# Patient Record
Sex: MALE | Race: WHITE | NOT HISPANIC OR LATINO | Employment: FULL TIME | URBAN - METROPOLITAN AREA
[De-identification: names, ages, dates, MRNs, and addresses within clinical notes are randomized per-mention and may not be internally consistent; named-entity substitution may affect disease eponyms.]

---

## 2023-02-06 ENCOUNTER — OFFICE VISIT (OUTPATIENT)
Dept: INTERNAL MEDICINE | Facility: CLINIC | Age: 43
End: 2023-02-06
Payer: COMMERCIAL

## 2023-02-06 VITALS
OXYGEN SATURATION: 96 % | TEMPERATURE: 98 F | DIASTOLIC BLOOD PRESSURE: 90 MMHG | HEART RATE: 95 BPM | SYSTOLIC BLOOD PRESSURE: 142 MMHG | WEIGHT: 244 LBS | BODY MASS INDEX: 31.32 KG/M2 | HEIGHT: 74 IN

## 2023-02-06 DIAGNOSIS — R53.83 FATIGUE, UNSPECIFIED TYPE: ICD-10-CM

## 2023-02-06 DIAGNOSIS — H61.23 BILATERAL IMPACTED CERUMEN: ICD-10-CM

## 2023-02-06 DIAGNOSIS — Z13.1 SCREENING FOR DIABETES MELLITUS: ICD-10-CM

## 2023-02-06 DIAGNOSIS — R03.0 ELEVATED BLOOD PRESSURE READING: ICD-10-CM

## 2023-02-06 DIAGNOSIS — Z00.00 ANNUAL PHYSICAL EXAM: Primary | ICD-10-CM

## 2023-02-06 DIAGNOSIS — Z11.4 ENCOUNTER FOR SCREENING FOR HIV: ICD-10-CM

## 2023-02-06 DIAGNOSIS — Z11.59 NEED FOR HEPATITIS C SCREENING TEST: ICD-10-CM

## 2023-02-06 DIAGNOSIS — F17.200 NICOTINE DEPENDENCE WITH CURRENT USE: ICD-10-CM

## 2023-02-06 DIAGNOSIS — R10.32 LEFT LOWER QUADRANT ABDOMINAL PAIN: ICD-10-CM

## 2023-02-06 DIAGNOSIS — Z13.220 SCREENING FOR HYPERLIPIDEMIA: ICD-10-CM

## 2023-02-06 DIAGNOSIS — Z91.89 AT RISK FOR OBSTRUCTIVE SLEEP APNEA: ICD-10-CM

## 2023-02-06 DIAGNOSIS — R19.4 CHANGE IN BOWEL HABIT: ICD-10-CM

## 2023-02-06 PROCEDURE — 99214 OFFICE O/P EST MOD 30 MIN: CPT | Performed by: STUDENT IN AN ORGANIZED HEALTH CARE EDUCATION/TRAINING PROGRAM

## 2023-02-06 PROCEDURE — 99386 PREV VISIT NEW AGE 40-64: CPT | Performed by: STUDENT IN AN ORGANIZED HEALTH CARE EDUCATION/TRAINING PROGRAM

## 2023-02-06 RX ORDER — NICOTINE 21 MG/24HR
1 PATCH, TRANSDERMAL 24 HOURS TRANSDERMAL EVERY 24 HOURS
Qty: 28 EACH | Refills: 0 | Status: SHIPPED | OUTPATIENT
Start: 2023-02-06

## 2023-02-06 RX ORDER — FLUTICASONE PROPIONATE 50 MCG
1 SPRAY, SUSPENSION (ML) NASAL DAILY
COMMUNITY

## 2023-02-06 RX ORDER — NICOTINE 21 MG/24HR
1 PATCH, TRANSDERMAL 24 HOURS TRANSDERMAL EVERY 24 HOURS
Qty: 14 EACH | Refills: 0 | Status: SHIPPED | OUTPATIENT
Start: 2023-02-06

## 2023-02-06 RX ORDER — CETIRIZINE HYDROCHLORIDE 10 MG/1
10 TABLET ORAL DAILY
COMMUNITY

## 2023-02-06 NOTE — PROGRESS NOTES
Gurmeet Lopes D.O.  Internal Medicine  [facility]  [address], [address]  [place] [address]  [phone]    Chief Complaint  Annual checkup    HISTORY    Piter Valdes is a 43 y.o. male who presents to the office today as a  a new patient for their annual preventative exam.      No hospitalization(s) within the last year.     Current exercise regimen:  Not really exercising     Status of chronic medical conditions:    Allergies: takes zyrtec and flonase over the counter    Any other concerns regarding their health today:     *Has noticed dull ache in the left lower abdomen for the last 6 months. States his bowel movements are more inconsistent, like there could be something blocking the passage of stool. Sometimes the bowel movements are formed and other times more runny.  States he has noticed some blood in the stool a few times. He describes the pain as achy and there on and off throughout the day. No fever or chills. He reports that people have been telling him that he looks like he lost weight but he is not eating anything different.     *Patient is also concerned about sleep apnea. Patient states he snores heavily, wife has appreciated that he stops breathing at times. Patient does not wake up feeling well rested.     Health Maintenance Summary          Overdue - COVID-19 Vaccine (1) Overdue - never done    No completion history exists for this topic.          Overdue - TDAP/TD VACCINES (1 - Tdap) Overdue - never done    No completion history exists for this topic.          Overdue - INFLUENZA VACCINE (Yearly - August to March) Overdue since 8/1/2022 09/12/2018  Outside Immunization: Influenza Ped Quad P-Free    10/26/2016  Outside Immunization: Influenza, P-Free          Ordered - HEPATITIS C SCREENING (Once) Ordered on 2/6/2023    No completion history exists for this topic.          ANNUAL PHYSICAL (Yearly) Next due on 2/6/2024 02/06/2023  Done           "Pneumococcal Vaccine 0-64 (Series Information) Aged Out    No completion history exists for this topic.                 No Known Allergies     Outpatient Medications Marked as Taking for the 2/6/23 encounter (Office Visit) with Gurmeet Lopes,    Medication Sig Dispense Refill   • cetirizine (zyrTEC) 10 MG tablet Take 10 mg by mouth Daily.     • fluticasone (FLONASE) 50 MCG/ACT nasal spray 1 spray into the nostril(s) as directed by provider Daily.         Past Medical History:   Diagnosis Date   • Allergies    • Dog bite     both legs, sutured     Past Surgical History:   Procedure Laterality Date   • WISDOM TOOTH EXTRACTION       Family History   Problem Relation Age of Onset   • No Known Problems Mother    • Diabetes Father    • No Known Problems Sister     reports that he has been smoking cigarettes. He started smoking about 29 years ago. He has a 29.00 pack-year smoking history. He has never used smokeless tobacco. He reports current alcohol use of about 6.0 - 12.0 standard drinks per week. He reports that he does not currently use drugs after having used the following drugs: Marijuana.    Immunization History   Administered Date(s) Administered   • Influenza Injectable Mdck Pf Quad 09/14/2022        OBJECTIVE    Vital Signs:   /90   Pulse 95   Temp 98 °F (36.7 °C)   Ht 188 cm (74\")   Wt 111 kg (244 lb)   SpO2 96%   BMI 31.33 kg/m²     Physical Exam  Vitals reviewed.   Constitutional:       General: He is not in acute distress.     Appearance: Normal appearance. He is obese. He is not ill-appearing.   HENT:      Head: Normocephalic and atraumatic.      Right Ear: External ear normal. There is impacted cerumen.      Left Ear: External ear normal. There is impacted cerumen.      Mouth/Throat:      Mouth: Mucous membranes are moist.      Pharynx: No oropharyngeal exudate or posterior oropharyngeal erythema.   Eyes:      General: No scleral icterus.     Extraocular Movements: Extraocular movements intact. "      Conjunctiva/sclera: Conjunctivae normal.      Pupils: Pupils are equal, round, and reactive to light.   Cardiovascular:      Rate and Rhythm: Normal rate and regular rhythm.      Heart sounds: Normal heart sounds. No murmur heard.  Pulmonary:      Effort: Pulmonary effort is normal. No respiratory distress.      Breath sounds: Normal breath sounds. No wheezing.   Abdominal:      General: Bowel sounds are normal. There is no distension.      Palpations: Abdomen is soft.      Tenderness: There is no abdominal tenderness. There is no guarding.   Musculoskeletal:      Cervical back: Neck supple. No tenderness.      Right lower leg: No edema.      Left lower leg: No edema.   Lymphadenopathy:      Cervical: No cervical adenopathy.   Skin:     General: Skin is warm and dry.      Coloration: Skin is not jaundiced.   Neurological:      General: No focal deficit present.      Mental Status: He is alert and oriented to person, place, and time.      Cranial Nerves: No cranial nerve deficit.      Motor: No weakness.   Psychiatric:         Mood and Affect: Mood normal.         Behavior: Behavior normal.         Thought Content: Thought content normal.                             ASSESSMENT & PLAN     1. Annual Preventative Health Examination  -Age and sex appropriate physical exam performed and documented. Updated past medical, family, social and surgical histories as well as allergies and care team list. Addressed care gaps listed in the medical record.  -Encouraged annual dental and vision exams as part of their overall health.  -Encouraged minimum of 30 minutes or more of exercise at a brisk walk or higher 5 days per week combined with a well-balanced diet.   -Immunizations reviewed and updated in EMR.   -Lipid screening:  Will screen for hyperlipidemia today and calculate ASCVD risk if appropriate.    -Aspirin for primary or secondary prevention: Not applicable, patient is less than age 50.  -Depression and Anxiety  screening: PHQ2 performed and the patient's screen was negative.  -Diabetes screening: Patient is 35-70 years of age and overweight, screening for diabetes is indicated every 3 years. Screening is not up to date and will be updated today.   -Tobacco use screening: Patient reports cigarette use. Tobacco counseling was was given.  -Alcohol use screening: Patient reports drinking 6-12 drinks per week.. Alcohol abuse counseling was was not indicated.  -Illicit drug screening: Patient does not use illicit drugs.   -Abdominal aortic aneurysm screening: AAA screening is not indicated as patient is less than 65 years of age.  -Hypertension screening: + screen, see plan below  -HIV screening: will screen today  -Hepatitis C virus screening:  Will screen today  -Syphilis screening: Syphilis screening not indicated.  -Hepatitis B virus screening: Screening not indicated, not in a high-risk group.  -Colon cancer screening: being referred to GI for consideration of early colonoscopy due to change in bowel habits (see plan below)  -Lung cancer screening: Patient is less than age 50, screening not indicated.  -Prostate cancer screening: Not applicable, patient is less than 50 years old.      Follow up in 1 year for annual physical exam.    A problem-based visit was also conducted on the same day, see below for assessment and plan    Diagnoses and all orders for this visit:    1. Nicotine dependence with current use (Primary)  -29 pack years despite relatively young age  -stressed importance of nicotine cessation for heart/lung health   -discussed nicotine replacement therapy and appropriate use of patches and gum  -discussed setting a quit date and begin use of nicotine patches and gum on that date  -he seems interested in quitting so we will reassess this in 3 months at his follow up  -     nicotine (NICODERM CQ) 21 MG/24HR patch; Place 1 patch on the skin as directed by provider Daily. Use for weeks 1-4  Dispense: 28 each;  Refill: 0  -     nicotine (NICODERM CQ) 14 MG/24HR patch; Place 1 patch on the skin as directed by provider Daily. Use for weeks 5 and 6.  Dispense: 14 each; Refill: 0  -     nicotine (NICODERM CQ) 7 MG/24HR patch; Place 1 patch on the skin as directed by provider Daily. Use for weeks 7 and 8.  Dispense: 14 each; Refill: 0  -     nicotine polacrilex (NICORETTE) 2 MG gum; Chew 1 each As Needed for Smoking Cessation. Use every 3 hours for cravings. Use one piece of gum at a time. Put a piece of gum into your mouth, and chew it slowly a few times to break it down. Park the gum between your gums and cheek.  Dispense: 240 each; Refill: 1    2. Fatigue, unspecified type  3. At risk for obstructive sleep apnea  -Patient is also concerned about sleep apnea. Patient states he snores heavily, wife has appreciated that he stops breathing at times. Patient does not wake up feeling well rested.   -he has risk factors for ROXANA (male, obesity); will refer to sleep medicine for consideration of sleep study  -     Ambulatory Referral to Sleep Medicine    4. Left lower quadrant abdominal pain  5. Change in bowel habit  -Has noticed dull ache in the left lower abdomen for the last 6 months. States his bowel movements are more inconsistent, like there could be something blocking the passage of stool. Sometimes the bowel movements are formed and other times more runny.  States he has noticed some blood in the stool a few times. He describes the pain as achy and there on and off throughout the day. No fever or chills. He reports that people have been telling him that he looks like he lost weight but he is not eating anything different.   -abdominal exam is benign  -will get CT abd/pelvis with contrast to evaluate as well as referral to GI for consideration of early colonoscopy  -further plan depending upon results     6. Bilateral impacted cerumen  -begin OTC ear wax softening drops    7. Elevated blood pressure reading  -/90 in  office today  -no history of HTN  -will need to closely monitor at his next visit; if persistent in office elevation will begin ambulatory BP monitoring           The following social determinates of health impact the patient's medical decision making: No social determinates of health were factored in to today's visit.     Follow Up  Return in about 3 months (around 5/6/2023) for Recheck.      Patient/family had no further questions at this time and verbalized understanding of the plan discussed today.

## 2023-02-07 ENCOUNTER — TELEPHONE (OUTPATIENT)
Dept: INTERNAL MEDICINE | Facility: CLINIC | Age: 43
End: 2023-02-07

## 2023-02-07 LAB
ALBUMIN SERPL-MCNC: 4.6 G/DL (ref 4–5)
ALBUMIN/GLOB SERPL: 2.1 {RATIO} (ref 1.2–2.2)
ALP SERPL-CCNC: 87 IU/L (ref 44–121)
ALT SERPL-CCNC: 20 IU/L (ref 0–44)
AST SERPL-CCNC: 17 IU/L (ref 0–40)
BASOPHILS # BLD AUTO: 0 X10E3/UL (ref 0–0.2)
BASOPHILS NFR BLD AUTO: 1 %
BILIRUB SERPL-MCNC: 0.3 MG/DL (ref 0–1.2)
BUN SERPL-MCNC: 15 MG/DL (ref 6–24)
BUN/CREAT SERPL: 15 (ref 9–20)
CALCIUM SERPL-MCNC: 9.4 MG/DL (ref 8.7–10.2)
CHLORIDE SERPL-SCNC: 100 MMOL/L (ref 96–106)
CHOLEST SERPL-MCNC: 202 MG/DL (ref 100–199)
CO2 SERPL-SCNC: 22 MMOL/L (ref 20–29)
CREAT SERPL-MCNC: 0.99 MG/DL (ref 0.76–1.27)
EGFRCR SERPLBLD CKD-EPI 2021: 97 ML/MIN/1.73
EOSINOPHIL # BLD AUTO: 0.2 X10E3/UL (ref 0–0.4)
EOSINOPHIL NFR BLD AUTO: 4 %
ERYTHROCYTE [DISTWIDTH] IN BLOOD BY AUTOMATED COUNT: 12.7 % (ref 11.6–15.4)
GLOBULIN SER CALC-MCNC: 2.2 G/DL (ref 1.5–4.5)
GLUCOSE SERPL-MCNC: 174 MG/DL (ref 70–99)
HBA1C MFR BLD: 5.5 % (ref 4.8–5.6)
HCT VFR BLD AUTO: 45.9 % (ref 37.5–51)
HCV AB S/CO SERPL IA: <0.1 S/CO RATIO (ref 0–0.9)
HDLC SERPL-MCNC: 38 MG/DL
HGB BLD-MCNC: 15.8 G/DL (ref 13–17.7)
HIV 1+2 AB+HIV1 P24 AG SERPL QL IA: NON REACTIVE
IMM GRANULOCYTES # BLD AUTO: 0 X10E3/UL (ref 0–0.1)
IMM GRANULOCYTES NFR BLD AUTO: 0 %
LDLC SERPL CALC-MCNC: 77 MG/DL (ref 0–99)
LDLC/HDLC SERPL: 2 RATIO (ref 0–3.6)
LYMPHOCYTES # BLD AUTO: 2.1 X10E3/UL (ref 0.7–3.1)
LYMPHOCYTES NFR BLD AUTO: 33 %
MCH RBC QN AUTO: 30.3 PG (ref 26.6–33)
MCHC RBC AUTO-ENTMCNC: 34.4 G/DL (ref 31.5–35.7)
MCV RBC AUTO: 88 FL (ref 79–97)
MONOCYTES # BLD AUTO: 0.4 X10E3/UL (ref 0.1–0.9)
MONOCYTES NFR BLD AUTO: 7 %
NEUTROPHILS # BLD AUTO: 3.4 X10E3/UL (ref 1.4–7)
NEUTROPHILS NFR BLD AUTO: 55 %
PLATELET # BLD AUTO: 196 X10E3/UL (ref 150–450)
POTASSIUM SERPL-SCNC: 3.6 MMOL/L (ref 3.5–5.2)
PROT SERPL-MCNC: 6.8 G/DL (ref 6–8.5)
RBC # BLD AUTO: 5.22 X10E6/UL (ref 4.14–5.8)
SODIUM SERPL-SCNC: 138 MMOL/L (ref 134–144)
TRIGL SERPL-MCNC: 552 MG/DL (ref 0–149)
TSH SERPL DL<=0.005 MIU/L-ACNC: 3.19 UIU/ML (ref 0.45–4.5)
VLDLC SERPL CALC-MCNC: 87 MG/DL (ref 5–40)
WBC # BLD AUTO: 6.2 X10E3/UL (ref 3.4–10.8)

## 2023-02-07 NOTE — TELEPHONE ENCOUNTER
AFTER BEING ON HOLD FOR GREATER THAN 3 HOURS WITH [employer] INS[employer]ANCE;  STATES UNABLE TO LOCATE MEMBER IN THEIR SYSTEM, MEMBER (PATIENT) TO CONTACT EMPLOYER FOR A DATA/DEMOGRAPHIC MATCH AND OVERRIDE FOR OUT PATIENT TEST.      PRACTICE MANAGER NOTIFIED PATIENT WHO WILL CONTACT EMPLOYER

## 2023-02-13 ENCOUNTER — OFFICE VISIT (OUTPATIENT)
Dept: GASTROENTEROLOGY | Facility: CLINIC | Age: 43
End: 2023-02-13
Payer: COMMERCIAL

## 2023-02-13 ENCOUNTER — TELEPHONE (OUTPATIENT)
Dept: GASTROENTEROLOGY | Facility: CLINIC | Age: 43
End: 2023-02-13
Payer: COMMERCIAL

## 2023-02-13 VITALS
HEART RATE: 91 BPM | WEIGHT: 243.2 LBS | BODY MASS INDEX: 31.21 KG/M2 | TEMPERATURE: 96.9 F | DIASTOLIC BLOOD PRESSURE: 84 MMHG | SYSTOLIC BLOOD PRESSURE: 137 MMHG | HEIGHT: 74 IN

## 2023-02-13 DIAGNOSIS — R10.32 LEFT LOWER QUADRANT ABDOMINAL PAIN: ICD-10-CM

## 2023-02-13 DIAGNOSIS — R19.4 CHANGE IN BOWEL HABITS: Primary | ICD-10-CM

## 2023-02-13 DIAGNOSIS — K62.5 RECTAL BLEEDING: ICD-10-CM

## 2023-02-13 PROCEDURE — 99204 OFFICE O/P NEW MOD 45 MIN: CPT | Performed by: NURSE PRACTITIONER

## 2023-02-13 RX ORDER — DICYCLOMINE HYDROCHLORIDE 10 MG/1
10 CAPSULE ORAL 3 TIMES DAILY PRN
Qty: 120 CAPSULE | Refills: 3 | Status: SHIPPED | OUTPATIENT
Start: 2023-02-13

## 2023-02-13 NOTE — TELEPHONE ENCOUNTER
OK FOR HUB TO READ  SW patient via telephone for COLONOSCOPY. Scheduled 2/22/23 with arrival time of [facility] WILL CALL WITH TIME. Prep paperwork mailed to verified address on file. Patient advised arrival time may change based on [facility] guidelines. JEB APRIL [facility]

## 2023-02-13 NOTE — PROGRESS NOTES
Chief Complaint   Patient presents with   • Abdominal Pain       HPI    Piter Valdes is a  43 y.o. male here to establish care as a new patient for complaints of abdominal pain.    This patient will also follow with Dr. Robbins.    Patient reports approximately 6 months of lower abdominal pain seems to localize to the left side of his abdomen.  Pain comes and goes described as a mild ache.  No correlation eating but does seem to improve with defecation.  He also reports change in bowel habits having more frequent bowel movements smaller volume with intermittent rectal bleeding.  Subjective weight loss of around 10 pounds    Rare heartburn.  No nausea, vomiting, dysphagia, odynophagia reports appetite is good.    Recent lab work included normal TSH, LFTs along with hemogram.    No family history of colon cancer.    CT has been ordered by his primary care provider but he still trying to work out the details with his insurance company.    Past Medical History:   Diagnosis Date   • Allergies    • Clotting disorder (HCC)     See comment from GI bleeding   • Dog bite     both legs, sutured   • GI (gastrointestinal bleed)     Not hilda of dates but I have noticed blood in my stool a few times   • Hypertriglyceridemia    • Lactose intolerance 10/12/17    Not sure of exact date but has developed over time.       Past Surgical History:   Procedure Laterality Date   • WISDOM TOOTH EXTRACTION         Scheduled Meds:     Continuous Infusions: No current facility-administered medications for this visit.      PRN Meds:     No Known Allergies    Social History     Socioeconomic History   • Marital status:      Spouse name: Natalya   Tobacco Use   • Smoking status: Every Day     Packs/day: 1.00     Years: 29.00     Pack years: 29.00     Types: Cigarettes     Start date: 1/1/1994   • Smokeless tobacco: Never   • Tobacco comments:     Dont know the date but i started smoking when i was 13 years old.   Substance and Sexual  Activity   • Alcohol use: Yes     Comment: I drink alcohol but i’m not sure of an ammount   • Drug use: Not Currently     Types: Marijuana   • Sexual activity: Yes     Partners: Female     Birth control/protection: None       Family History   Problem Relation Age of Onset   • No Known Problems Mother    • Diabetes Father    • No Known Problems Sister        Review of Systems   Constitutional: Negative for activity change, appetite change, fatigue and unexpected weight change.   HENT: Negative for trouble swallowing.    Eyes: Negative.    Respiratory: Negative.    Cardiovascular: Negative.    Gastrointestinal: Positive for abdominal pain and anal bleeding. Negative for abdominal distention, blood in stool, constipation, diarrhea, nausea, rectal pain and vomiting.   Endocrine: Negative.    Genitourinary: Negative.    Musculoskeletal: Negative.    Allergic/Immunologic: Negative.    Neurological: Negative.    Hematological: Negative.    Psychiatric/Behavioral: Negative.        Vitals:    02/13/23 1403   BP: 137/84   Pulse: 91   Temp: 96.9 °F (36.1 °C)       Physical Exam  Constitutional:       Appearance: He is well-developed.   Abdominal:      General: Bowel sounds are normal. There is no distension.      Palpations: Abdomen is soft. There is no mass.      Tenderness: There is no abdominal tenderness. There is no guarding.      Hernia: No hernia is present.   Skin:     General: Skin is warm and dry.      Capillary Refill: Capillary refill takes less than 2 seconds.   Neurological:      Mental Status: He is alert and oriented to person, place, and time.   Psychiatric:         Behavior: Behavior normal.     Assessment    Diagnoses and all orders for this visit:    1. Change in bowel habits (Primary)  -     Celiac Comprehensive Panel  -     Case Request; Standing  -     Case Request    2. Left lower quadrant abdominal pain  -     Case Request; Standing  -     Case Request    3. Rectal bleeding    Other orders  -      dicyclomine (BENTYL) 10 MG capsule; Take 1 capsule by mouth 3 (Three) Times a Day As Needed (Abdominal pain).  Dispense: 120 capsule; Refill: 3       Plan    Recommend colonoscopy for change in bowel habits with abdominal pain and rectal bleeding  Patient nontender on physical examination today  Give trial of Bentyl antispasmodic in the interim  Celiac comprehensive panel today  Further recommendations and follow-up pending endoscopic findings         VANESSA Camargo  [facility]  [address] [place] [address]  Office: [phone]

## 2023-02-14 LAB
ENDOMYSIUM IGA SER QL: NEGATIVE
GLIADIN PEPTIDE IGA SER-ACNC: 5 UNITS (ref 0–19)
GLIADIN PEPTIDE IGG SER-ACNC: 3 UNITS (ref 0–19)
IGA SERPL-MCNC: 163 MG/DL (ref 90–386)
TTG IGA SER-ACNC: <2 U/ML (ref 0–3)
TTG IGG SER-ACNC: <2 U/ML (ref 0–5)

## 2023-02-22 ENCOUNTER — LAB REQUISITION (OUTPATIENT)
Dept: LAB | Facility: HOSPITAL | Age: 43
End: 2023-02-22
Payer: COMMERCIAL

## 2023-02-22 ENCOUNTER — OUTSIDE FACILITY SERVICE (OUTPATIENT)
Dept: GASTROENTEROLOGY | Facility: CLINIC | Age: 43
End: 2023-02-22
Payer: COMMERCIAL

## 2023-02-22 DIAGNOSIS — R19.4 CHANGE IN BOWEL HABIT: ICD-10-CM

## 2023-02-22 PROCEDURE — 45385 COLONOSCOPY W/LESION REMOVAL: CPT | Performed by: INTERNAL MEDICINE

## 2023-02-22 PROCEDURE — 88305 TISSUE EXAM BY PATHOLOGIST: CPT | Performed by: INTERNAL MEDICINE

## 2023-02-24 LAB
LAB AP CASE REPORT: NORMAL
PATH REPORT.FINAL DX SPEC: NORMAL
PATH REPORT.GROSS SPEC: NORMAL

## 2023-03-01 ENCOUNTER — TELEPHONE (OUTPATIENT)
Dept: GASTROENTEROLOGY | Facility: CLINIC | Age: 43
End: 2023-03-01
Payer: COMMERCIAL

## 2023-03-01 ENCOUNTER — OFFICE VISIT (OUTPATIENT)
Dept: SLEEP MEDICINE | Facility: HOSPITAL | Age: 43
End: 2023-03-01
Payer: COMMERCIAL

## 2023-03-01 VITALS
DIASTOLIC BLOOD PRESSURE: 74 MMHG | HEART RATE: 86 BPM | OXYGEN SATURATION: 97 % | SYSTOLIC BLOOD PRESSURE: 131 MMHG | BODY MASS INDEX: 31.21 KG/M2 | WEIGHT: 243.2 LBS | HEIGHT: 74 IN

## 2023-03-01 DIAGNOSIS — G47.8 NON-RESTORATIVE SLEEP: ICD-10-CM

## 2023-03-01 DIAGNOSIS — G47.10 HYPERSOMNOLENCE: ICD-10-CM

## 2023-03-01 DIAGNOSIS — E66.09 CLASS 1 OBESITY DUE TO EXCESS CALORIES WITH BODY MASS INDEX (BMI) OF 31.0 TO 31.9 IN ADULT, UNSPECIFIED WHETHER SERIOUS COMORBIDITY PRESENT: ICD-10-CM

## 2023-03-01 DIAGNOSIS — R06.81 WITNESSED EPISODE OF APNEA: Primary | ICD-10-CM

## 2023-03-01 PROCEDURE — 99204 OFFICE O/P NEW MOD 45 MIN: CPT | Performed by: NURSE PRACTITIONER

## 2023-03-01 PROCEDURE — G0463 HOSPITAL OUTPT CLINIC VISIT: HCPCS

## 2023-03-01 NOTE — TELEPHONE ENCOUNTER
----- Message from VANESSA Camargo sent at 2/17/2023 12:36 PM EST -----  Please inform the patient lab work is normal await endoscopic findings.

## 2023-03-01 NOTE — TELEPHONE ENCOUNTER
Colonoscopy placed in recall for  2/22/26.  Pt reviewed results via [employer]. Sent pt MyChart msg advising of results. Advised to call if any questions.

## 2023-03-01 NOTE — PROGRESS NOTES
[facility]  [address]  [address]  [place] [address]  Phone   Fax         Piter Valdes  [id number]   1980  43 y.o.  male      Referring Provider and PCP: Gurmeet Lopes DO    Type of service: Initial Sleep Medicine Consult.  Date of service: 3/1/2023      CHIEF COMPLAINT: Snoring, witnessed apnea, nonrestorative sleep      HISTORY OF PRESENT ILLNESS:  Thank you for asking to see Piter Valdes, 43 y.o.. Piter Valdes was seen today on 3/1/2023 at [facility].  Patient presents today with symptoms of snoring, non-restorative sleep, hypersomnolence, and witnessed apneas (per wife).  Even when he sleeps 10 hours a night he is still tired in the morning.  The symptoms are chronic for years and are persistent in nature.  Patient has no history of tonsillectomy, adenoidectomy, nasal surgery, UPPP.           SLEEP HISTORY:  Sleep schedule:  Bedtime: 11 PM to 12 AM  Wake time: 530 to 6 AM weekdays, 9 to 10 AM weekends  Normally takes about 10-15 minutes to fall asleep  Average hours of sleep: 6 hours weekdays, 10 hours weekends  Number of naps per day: 0    Symptoms:   In addition to snoring, nonrestorative sleep, and witnessed apneas patient gives the following associated symptoms:  Have you ever awakened gasping for breath, coughing, choking: Yes   Change in weight:  No   Morning headaches:  No   Awaken with a sore throat or dry mouth:  Yes   Leg jerking at night:  No   Crawly feeling/urge sensation to move in the legs: No   Teeth grinding: No   Have you ever awakened at night with a sour taste or burning sensation in your chest:  No   Do you have muscle weakness with laughing or anger:  No   Have you ever felt paralyzed while going to sleep or waking up:  No   Sleepwalking: No   Nightmares: No   Nocturia (urination at night): 2-3 times per night  Memory Problem: Yes     Medical Conditions (PMH):   1. Poor memory  2. Trouble  "concentrating    Social history:  Do you drive a commercial vehicle:  No   Shift work:  No   Tobacco use:  Yes, cigarettes  Alcohol use: 12 per week  Caffeinated drinks: 3 cups of coffee per day  Occupation:     Family History (parents and siblings) (pertaining to sleep medicine):  1. Sleep apnea-- in father    Medications: reviewed    Allergies:  Patient has no known allergies.      REVIEW OF SYSTEMS:  Pertinent positive symptoms are:  • Snoring  • Witnessed apnea  • Daytime excessive sleepiness with [place] Sleepiness Scale of Total score: 14   • Fatigue  • Trouble concentrating  • Poor memory  • Nasal congestion        PHYSICAL EXAM:  CONSTITUTINONAL:   Vitals:    03/01/23 1300   BP: 131/74   Pulse: 86   SpO2: 97%   Weight: 110 kg (243 lb 3.2 oz)   Height: 188 cm (74\")    Body mass index is 31.23 kg/m².   HEAD: atraumatic, normocephalic   NOSE: nasal passages are clear  THROAT: Mallampati class IV  NECK: Neck Circumference: 17 inches, trachea is midline  RESPIRATORY SYSTEM: Breath sounds are normal, breathing unlabored, no wheezing present on exam  CARDIOVASULAR SYSTEM: Heart sounds are regular rhythm and normal rate, no edema  NEUROLOGICAL SYSTEM: Alert and oriented x 3, normal gait  PSYCHIATRIC SYSTEM: Mood is normal/ appropriate     Office notes from care team reviewed. Office note dated 2/6/23, reviewed.    Labs reviewed.  TSH Results:  TSH    TSH 2/6/23   TSH 3.190            Most Recent A1C    HGBA1C Most Recent 2/6/23   Hemoglobin A1C 5.5      Comments are available for some flowsheets but are not being displayed.                  ASSESSMENT AND PLAN:   · Witnessed apnea (R06.81): patient's symptoms and physical examination are consistent with sleep apnea (G47.30).   I discussed the signs, symptoms, and pathophysiology of sleep apnea with this patient.  I also discussed the potential complications of untreated sleep apnea including but not limited to resistant hypertension, insulin " resistance, pulmonary hypertension, atrial fibrillation, stroke, nonrestorative sleep with hypersomnia which can increase risk for motor vehicle accidents, etc.   Different testing methods including home-based and lab based sleep studies were discussed with this patient.   Based on patient history and physical examination, the most appropriate study is home sleep study.  The order for the sleep study is placed in [facility].  The test will be scheduled after prior authorization has been obtained through patient's insurance.  Treatment and management will be discussed in more detail with this patient after the test is completed.  All questions were answered to patient's satisfaction.   · Snoring (R06.83): snoring is the sound created by turbulent airflow vibrating upper airway soft tissue.  I have also discussed factors affecting snoring including sleep deprivation, sleeping on the back and alcohol ingestion. To minimize snoring, patient is advised to have adequate sleep, sleep on their side, and avoid alcohol and sedative medications around bedtime.   · Excessive daytime sleepiness: [place] Sleepiness Scale of Total score: 14.  There are many causes of excessive daytime sleepiness including but not limited to sleep apnea, shiftwork syndrome, depression, and other medical disorders such as heart disease, kidney disease, and liver failure.  The most serious cause of excessive sleepiness is due to neurological conditions such as narcolepsy/cataplexy.  More commonly excessive sleepiness is caused by sleep apnea with frequent awakenings during sleep time.  I have discussed safety of driving and to remain vigilant while driving.  · Obesity: Body mass index is 31.23 kg/m².. Patients who are overweight or obese are at increased risk of sleep apnea/ sleep disordered breathing. Weight reduction and healthy lifestyle are encouraged in overweight/ obese patients as part of a comprehensive approach to sleep apnea treatment.     · Nicotine dependence: recommend cessation.  PCP has prescribed nicotine patches.   · Elevated blood pressure reading: at PCP office recently.  PCP monitoring.   · Poor memory and trouble concentrating      Patient will follow-up after study, 31 to 90 days after PAP initiated if applicable, or contact the office sooner for questions or concerns. Patient's questions were answered.            Thank you again for asking me to consult on this patient.  Please do not hesitate to call me if you have additional questions or concerns.       Rain Bae DNP, APRN  [facility]

## 2023-03-01 NOTE — TELEPHONE ENCOUNTER
----- Message from Jose Robbins MD sent at 2/25/2023  3:02 PM EST -----  Tubular adenomas  Hyperplastic polyps  Colonoscopy recall 3 years

## 2023-03-13 ENCOUNTER — TELEPHONE (OUTPATIENT)
Dept: INTERNAL MEDICINE | Facility: CLINIC | Age: 43
End: 2023-03-13

## 2023-03-13 NOTE — TELEPHONE ENCOUNTER
Caller: Piter Valdes    Relationship: Self    Best call back number: [phone]    What is the best time to reach you: ANYTIME     Who are you requesting to speak with (clinical staff, provider,  specific staff member): CLINICAL     What was the call regarding: PATIENT IS WONDERING IF DR. STRATTON WANTS HIM TO STILL GET THE CT SCAN. PATIENT STATES HE ALREADY HAD THE COLONOSCOPY DONE ABOUT TWO WEEKS AGO.     Do you require a callback: YES

## 2023-03-15 ENCOUNTER — HOSPITAL ENCOUNTER (OUTPATIENT)
Dept: SLEEP MEDICINE | Facility: HOSPITAL | Age: 43
Discharge: HOME | End: 2023-03-15
Admitting: NURSE PRACTITIONER
Payer: COMMERCIAL

## 2023-03-15 DIAGNOSIS — G47.10 HYPERSOMNOLENCE: ICD-10-CM

## 2023-03-15 DIAGNOSIS — G47.8 NON-RESTORATIVE SLEEP: ICD-10-CM

## 2023-03-15 DIAGNOSIS — R06.81 WITNESSED EPISODE OF APNEA: ICD-10-CM

## 2023-03-15 DIAGNOSIS — E66.09 CLASS 1 OBESITY DUE TO EXCESS CALORIES WITH BODY MASS INDEX (BMI) OF 31.0 TO 31.9 IN ADULT, UNSPECIFIED WHETHER SERIOUS COMORBIDITY PRESENT: ICD-10-CM

## 2023-03-15 PROCEDURE — 95806 SLEEP STUDY UNATT&RESP EFFT: CPT

## 2023-03-15 NOTE — TELEPHONE ENCOUNTER
Called patient and LDM that Dr. Lopes indicated in his note that he wanted patient to have both Colonoscopy and CT Abd/Pel. Informed patient to call back with any questions and concerns

## 2023-03-23 DIAGNOSIS — G47.33 OSA (OBSTRUCTIVE SLEEP APNEA): Primary | ICD-10-CM

## 2023-03-23 DIAGNOSIS — R06.83 SNORING: ICD-10-CM

## 2023-03-23 PROCEDURE — 95806 SLEEP STUDY UNATT&RESP EFFT: CPT | Performed by: INTERNAL MEDICINE

## 2023-03-24 ENCOUNTER — TELEPHONE (OUTPATIENT)
Dept: SLEEP MEDICINE | Facility: HOSPITAL | Age: 43
End: 2023-03-24
Payer: COMMERCIAL

## 2023-03-24 NOTE — TELEPHONE ENCOUNTER
Spoke with pt about results and faxed order to [facility] for set up. Pt will cb and schedule a f/u appt with Rain for compliance.

## 2023-04-14 ENCOUNTER — HOSPITAL ENCOUNTER (OUTPATIENT)
Dept: CT IMAGING | Facility: HOSPITAL | Age: 43
Discharge: HOME | End: 2023-04-14
Admitting: STUDENT IN AN ORGANIZED HEALTH CARE EDUCATION/TRAINING PROGRAM
Payer: COMMERCIAL

## 2023-04-14 DIAGNOSIS — R10.32 LEFT LOWER QUADRANT ABDOMINAL PAIN: ICD-10-CM

## 2023-04-14 DIAGNOSIS — R19.4 CHANGE IN BOWEL HABIT: ICD-10-CM

## 2023-04-14 PROCEDURE — 74177 CT ABD & PELVIS W/CONTRAST: CPT

## 2023-04-14 PROCEDURE — 25510000001 IOPAMIDOL 61 % SOLUTION: Performed by: STUDENT IN AN ORGANIZED HEALTH CARE EDUCATION/TRAINING PROGRAM

## 2023-04-14 RX ADMIN — IOPAMIDOL 85 ML: 612 INJECTION, SOLUTION INTRAVENOUS at 14:09

## 2023-05-08 ENCOUNTER — OFFICE VISIT (OUTPATIENT)
Dept: INTERNAL MEDICINE | Facility: CLINIC | Age: 43
End: 2023-05-08
Payer: COMMERCIAL

## 2023-05-08 VITALS
OXYGEN SATURATION: 96 % | DIASTOLIC BLOOD PRESSURE: 77 MMHG | WEIGHT: 245 LBS | BODY MASS INDEX: 31.44 KG/M2 | HEART RATE: 84 BPM | SYSTOLIC BLOOD PRESSURE: 124 MMHG | HEIGHT: 74 IN

## 2023-05-08 DIAGNOSIS — F17.200 NICOTINE DEPENDENCE WITH CURRENT USE: Primary | ICD-10-CM

## 2023-05-08 DIAGNOSIS — E78.1 HYPERTRIGLYCERIDEMIA: ICD-10-CM

## 2023-05-08 PROCEDURE — 99214 OFFICE O/P EST MOD 30 MIN: CPT | Performed by: STUDENT IN AN ORGANIZED HEALTH CARE EDUCATION/TRAINING PROGRAM

## 2023-05-08 RX ORDER — VARENICLINE TARTRATE 1 MG/1
1 TABLET, FILM COATED ORAL 2 TIMES DAILY
Qty: 60 TABLET | Refills: 1 | Status: SHIPPED | OUTPATIENT
Start: 2023-06-07

## 2023-05-08 RX ORDER — VARENICLINE TARTRATE 0.5 MG/1
TABLET, FILM COATED ORAL
Qty: 11 TABLET | Refills: 0 | Status: SHIPPED | OUTPATIENT
Start: 2023-05-08 | End: 2023-05-15

## 2023-05-08 NOTE — PROGRESS NOTES
"  Gurmeet Lopes D.O.  Internal Medicine  [facility]  [address], [address]  [place] [address]  [phone]      Chief Complaint  3 months check-up    SUBJECTIVE    History of Present Illness    Piter Valdes is a 43 y.o. male who presents to the office today as an established patient that last saw me on 2/6/2023.     Smoking: still smoking a little less than one pack per day. Hasn't used nicotine patched yet that was prescribed last visit. States he wants to quit but just doesn't know if he can set that quit date to actually stop.     ROXANA: new diagnosis since last visit , has been using CPAP machine for around 3 weeks but still feels tired most of the time. Using it at least 6-8 hours nightly. Feels that his seal is good. Follows with sleep medicine.     No Known Allergies     Outpatient Medications Marked as Taking for the 5/8/23 encounter (Office Visit) with Gurmeet Lopes, DO   Medication Sig Dispense Refill   • cetirizine (zyrTEC) 10 MG tablet Take 1 tablet by mouth Daily.     • fluticasone (FLONASE) 50 MCG/ACT nasal spray 1 spray into the nostril(s) as directed by provider Daily.          Past Medical History:   Diagnosis Date   • Allergies    • Clotting disorder     See comment from GI bleeding   • Dog bite     both legs, sutured   • GI (gastrointestinal bleed)     Not hilda of dates but I have noticed blood in my stool a few times   • Hypertriglyceridemia    • Lactose intolerance 10/12/17    Not sure of exact date but has developed over time.   • ROXANA (obstructive sleep apnea)        OBJECTIVE    Vital Signs:   /77   Pulse 84   Ht 188 cm (74\")   Wt 111 kg (245 lb)   SpO2 96%   BMI 31.46 kg/m²     Physical Exam  Vitals reviewed.   Constitutional:       General: He is not in acute distress.     Appearance: He is obese. He is not ill-appearing.   Eyes:      General: No scleral icterus.  Pulmonary:      Effort: Pulmonary effort is normal. No respiratory distress. "   Skin:     Coloration: Skin is not jaundiced.   Neurological:      Mental Status: He is alert.   Psychiatric:         Mood and Affect: Mood normal.         Behavior: Behavior normal.         Thought Content: Thought content normal.                             ASSESSMENT & PLAN     Diagnoses and all orders for this visit:    1. Nicotine dependence with current use (Primary)  -chronic, uncontrolled   -still smoking a little less than one pack per day. Hasn't used nicotine patched yet that was prescribed last visit. States he wants to quit but just doesn't know if he can set that quit date to actually stop.   -discussed the importance to lung/heart health to stop smoking  -offered more aggressive cessation options such as Chantix. He is interested in treatment.   -discussed approaches to selecting a tobacco quit date with Chantix: May either choose a fixed quit date (ie, start varenicline, then quit on day 8) or a flexible quit date (ie, start varenicline, then quit between days 8 to 35). Alternatively, a gradual quit date (ie, start varenicline and reduce smoking 50% by week 4, reduce an additional 50% by week 8, and continue reducing with a goal of complete abstinence by week 12) is acceptable. Discussed common side effects including nausea, vomiting, abnormal dreams, headache, changes in sleep, changes in mood. He was agreeable to start therapy.   -     varenicline (Chantix) 0.5 MG tablet; Take 1 tablet by mouth Daily for 3 days, THEN 1 tablet 2 (Two) Times a Day for 4 days. Use for days 1-7.  Dispense: 11 tablet; Refill: 0  -     varenicline (CHANTIX) 1 MG tablet; Take 1 tablet by mouth 2 (Two) Times a Day. To start on DAY 8 of treatment.  Dispense: 60 tablet; Refill: 1    2. Hypertriglyceridemia  Lab Results   Component Value Date    CHLPL 202 (H) 02/06/2023    TRIG 552 (H) 02/06/2023    HDL 38 (L) 02/06/2023    LDL 77 02/06/2023     -recommended decreased fast/fatty/greast foods, increased  exercise  -Triglycerides very high at 552 but pt was not fasting  -repeat fasting lipid profile in the next few weeks to further assess   -     Lipid Panel With LDL/HDL Ratio                  The following social determinates of health impact the patient's medical decision making: No social determinates of health were factored in to today's visit.     Follow Up  Return in about 3 months (around 8/8/2023) for Recheck.    Patient/family had no further questions at this time and verbalized understanding of the plan discussed today.

## 2023-05-22 LAB
CHOLEST SERPL-MCNC: 194 MG/DL (ref 0–200)
HDLC SERPL-MCNC: 41 MG/DL (ref 40–60)
LDLC SERPL CALC-MCNC: 129 MG/DL (ref 0–100)
LDLC/HDLC SERPL: 3.07 {RATIO}
TRIGL SERPL-MCNC: 136 MG/DL (ref 0–150)
VLDLC SERPL CALC-MCNC: 24 MG/DL (ref 5–40)

## 2023-11-10 ENCOUNTER — TELEPHONE (OUTPATIENT)
Dept: INTERNAL MEDICINE | Facility: CLINIC | Age: 43
End: 2023-11-10
Payer: COMMERCIAL

## 2024-05-05 ENCOUNTER — HOSPITAL ENCOUNTER (EMERGENCY)
Facility: HOSPITAL | Age: 44
Discharge: HOME | End: 2024-05-06
Attending: EMERGENCY MEDICINE | Admitting: EMERGENCY MEDICINE
Payer: COMMERCIAL

## 2024-05-05 ENCOUNTER — APPOINTMENT (OUTPATIENT)
Dept: GENERAL RADIOLOGY | Facility: HOSPITAL | Age: 44
End: 2024-05-05
Payer: COMMERCIAL

## 2024-05-05 DIAGNOSIS — J98.01 BRONCHOSPASM: ICD-10-CM

## 2024-05-05 DIAGNOSIS — R00.2 PALPITATIONS: Primary | ICD-10-CM

## 2024-05-05 DIAGNOSIS — R03.0 ELEVATED BLOOD PRESSURE READING: ICD-10-CM

## 2024-05-05 LAB
ALBUMIN SERPL-MCNC: 4.8 G/DL (ref 3.5–5.2)
ALBUMIN/GLOB SERPL: 1.8 G/DL
ALP SERPL-CCNC: 76 U/L (ref 39–117)
ALT SERPL W P-5'-P-CCNC: 29 U/L (ref 1–41)
ANION GAP SERPL CALCULATED.3IONS-SCNC: 10.4 MMOL/L (ref 5–15)
AST SERPL-CCNC: 23 U/L (ref 1–40)
BASOPHILS # BLD AUTO: 0.07 10*3/MM3 (ref 0–0.2)
BASOPHILS NFR BLD AUTO: 0.7 % (ref 0–1.5)
BILIRUB SERPL-MCNC: 0.3 MG/DL (ref 0–1.2)
BUN SERPL-MCNC: 19 MG/DL (ref 6–20)
BUN/CREAT SERPL: 17.8 (ref 7–25)
CALCIUM SPEC-SCNC: 10.3 MG/DL (ref 8.6–10.5)
CHLORIDE SERPL-SCNC: 99 MMOL/L (ref 98–107)
CO2 SERPL-SCNC: 26.6 MMOL/L (ref 22–29)
CREAT SERPL-MCNC: 1.07 MG/DL (ref 0.76–1.27)
DEPRECATED RDW RBC AUTO: 39.8 FL (ref 37–54)
EGFRCR SERPLBLD CKD-EPI 2021: 87.8 ML/MIN/1.73
EOSINOPHIL # BLD AUTO: 0.31 10*3/MM3 (ref 0–0.4)
EOSINOPHIL NFR BLD AUTO: 3 % (ref 0.3–6.2)
ERYTHROCYTE [DISTWIDTH] IN BLOOD BY AUTOMATED COUNT: 12.3 % (ref 12.3–15.4)
GLOBULIN UR ELPH-MCNC: 2.7 GM/DL
GLUCOSE SERPL-MCNC: 133 MG/DL (ref 65–99)
HCT VFR BLD AUTO: 47.8 % (ref 37.5–51)
HGB BLD-MCNC: 16.4 G/DL (ref 13–17.7)
IMM GRANULOCYTES # BLD AUTO: 0.02 10*3/MM3 (ref 0–0.05)
IMM GRANULOCYTES NFR BLD AUTO: 0.2 % (ref 0–0.5)
LYMPHOCYTES # BLD AUTO: 2.65 10*3/MM3 (ref 0.7–3.1)
LYMPHOCYTES NFR BLD AUTO: 25.5 % (ref 19.6–45.3)
MCH RBC QN AUTO: 29.9 PG (ref 26.6–33)
MCHC RBC AUTO-ENTMCNC: 34.3 G/DL (ref 31.5–35.7)
MCV RBC AUTO: 87.1 FL (ref 79–97)
MONOCYTES # BLD AUTO: 0.58 10*3/MM3 (ref 0.1–0.9)
MONOCYTES NFR BLD AUTO: 5.6 % (ref 5–12)
NEUTROPHILS NFR BLD AUTO: 6.78 10*3/MM3 (ref 1.7–7)
NEUTROPHILS NFR BLD AUTO: 65 % (ref 42.7–76)
PLATELET # BLD AUTO: 191 10*3/MM3 (ref 140–450)
PMV BLD AUTO: 9.9 FL (ref 6–12)
POTASSIUM SERPL-SCNC: 3.6 MMOL/L (ref 3.5–5.2)
PROT SERPL-MCNC: 7.5 G/DL (ref 6–8.5)
RBC # BLD AUTO: 5.49 10*6/MM3 (ref 4.14–5.8)
SODIUM SERPL-SCNC: 136 MMOL/L (ref 136–145)
TROPONIN T SERPL HS-MCNC: <6 NG/L
WBC NRBC COR # BLD AUTO: 10.41 10*3/MM3 (ref 3.4–10.8)

## 2024-05-05 PROCEDURE — 80053 COMPREHEN METABOLIC PANEL: CPT | Performed by: EMERGENCY MEDICINE

## 2024-05-05 PROCEDURE — 99284 EMERGENCY DEPT VISIT MOD MDM: CPT

## 2024-05-05 PROCEDURE — 93005 ELECTROCARDIOGRAM TRACING: CPT | Performed by: EMERGENCY MEDICINE

## 2024-05-05 PROCEDURE — 71045 X-RAY EXAM CHEST 1 VIEW: CPT

## 2024-05-05 PROCEDURE — 84484 ASSAY OF TROPONIN QUANT: CPT | Performed by: EMERGENCY MEDICINE

## 2024-05-05 PROCEDURE — 25010000002 LABETALOL 5 MG/ML SOLUTION: Performed by: EMERGENCY MEDICINE

## 2024-05-05 PROCEDURE — 96374 THER/PROPH/DIAG INJ IV PUSH: CPT

## 2024-05-05 PROCEDURE — 99284 EMERGENCY DEPT VISIT MOD MDM: CPT | Performed by: EMERGENCY MEDICINE

## 2024-05-05 PROCEDURE — 93010 ELECTROCARDIOGRAM REPORT: CPT | Performed by: INTERNAL MEDICINE

## 2024-05-05 PROCEDURE — 85025 COMPLETE CBC W/AUTO DIFF WBC: CPT | Performed by: EMERGENCY MEDICINE

## 2024-05-05 RX ORDER — LABETALOL HYDROCHLORIDE 5 MG/ML
20 INJECTION, SOLUTION INTRAVENOUS ONCE
Status: COMPLETED | OUTPATIENT
Start: 2024-05-05 | End: 2024-05-05

## 2024-05-05 RX ORDER — SODIUM CHLORIDE 0.9 % (FLUSH) 0.9 %
10 SYRINGE (ML) INJECTION AS NEEDED
Status: DISCONTINUED | OUTPATIENT
Start: 2024-05-05 | End: 2024-05-06 | Stop reason: HOSPADM

## 2024-05-05 RX ADMIN — LABETALOL HYDROCHLORIDE 20 MG: 5 INJECTION, SOLUTION INTRAVENOUS at 23:14

## 2024-05-06 VITALS
BODY MASS INDEX: 32.75 KG/M2 | RESPIRATION RATE: 18 BRPM | DIASTOLIC BLOOD PRESSURE: 79 MMHG | HEIGHT: 74 IN | HEART RATE: 87 BPM | SYSTOLIC BLOOD PRESSURE: 133 MMHG | OXYGEN SATURATION: 95 % | WEIGHT: 255.2 LBS | TEMPERATURE: 98.4 F

## 2024-05-06 LAB
GEN 5 2HR TROPONIN T REFLEX: <6 NG/L
QT INTERVAL: 332 MS
QTC INTERVAL: 418 MS
TROPONIN T DELTA: NORMAL

## 2024-05-06 PROCEDURE — 63710000001 PREDNISONE PER 5 MG: Performed by: EMERGENCY MEDICINE

## 2024-05-06 PROCEDURE — 84484 ASSAY OF TROPONIN QUANT: CPT | Performed by: EMERGENCY MEDICINE

## 2024-05-06 PROCEDURE — 36415 COLL VENOUS BLD VENIPUNCTURE: CPT

## 2024-05-06 PROCEDURE — 94640 AIRWAY INHALATION TREATMENT: CPT

## 2024-05-06 RX ORDER — PREDNISONE 20 MG/1
TABLET ORAL
Qty: 12 TABLET | Refills: 0 | Status: SHIPPED | OUTPATIENT
Start: 2024-05-06

## 2024-05-06 RX ORDER — PREDNISONE 50 MG/1
50 TABLET ORAL ONCE
Status: COMPLETED | OUTPATIENT
Start: 2024-05-06 | End: 2024-05-06

## 2024-05-06 RX ORDER — ALBUTEROL SULFATE 90 UG/1
AEROSOL, METERED RESPIRATORY (INHALATION)
Qty: 6.7 G | Refills: 0 | Status: SHIPPED | OUTPATIENT
Start: 2024-05-06

## 2024-05-06 RX ORDER — AMLODIPINE BESYLATE 5 MG/1
5 TABLET ORAL DAILY
Qty: 30 TABLET | Refills: 0 | Status: SHIPPED | OUTPATIENT
Start: 2024-05-06 | End: 2024-06-05

## 2024-05-06 RX ORDER — IPRATROPIUM BROMIDE AND ALBUTEROL SULFATE 2.5; .5 MG/3ML; MG/3ML
3 SOLUTION RESPIRATORY (INHALATION) ONCE
Status: COMPLETED | OUTPATIENT
Start: 2024-05-06 | End: 2024-05-06

## 2024-05-06 RX ADMIN — PREDNISONE 50 MG: 50 TABLET ORAL at 01:43

## 2024-05-06 RX ADMIN — IPRATROPIUM BROMIDE AND ALBUTEROL SULFATE 3 ML: 2.5; .5 SOLUTION RESPIRATORY (INHALATION) at 01:10

## 2024-06-10 ENCOUNTER — TELEPHONE (OUTPATIENT)
Dept: INTERNAL MEDICINE | Facility: CLINIC | Age: 44
End: 2024-06-10
Payer: COMMERCIAL

## 2024-06-10 NOTE — TELEPHONE ENCOUNTER
Pt calling to schedule ER follow up from 05/05/2024- Pt was seen for high blood pressure- Pt states the ER doctor prescribed him bp medication, amlodipine 5 mg, and pt is almost out of it- Pt is wanting to know if he can get prescription refilled by Dr. Lopes? Please advise-       Pt is coming in for ER follow up appt this Wednesday, 06/12/2024.

## 2024-06-12 ENCOUNTER — OFFICE VISIT (OUTPATIENT)
Dept: INTERNAL MEDICINE | Facility: CLINIC | Age: 44
End: 2024-06-12
Payer: COMMERCIAL

## 2024-06-12 VITALS
BODY MASS INDEX: 31.44 KG/M2 | HEIGHT: 74 IN | DIASTOLIC BLOOD PRESSURE: 80 MMHG | SYSTOLIC BLOOD PRESSURE: 136 MMHG | HEART RATE: 64 BPM | WEIGHT: 245 LBS | OXYGEN SATURATION: 97 %

## 2024-06-12 DIAGNOSIS — I10 ESSENTIAL HYPERTENSION: Primary | ICD-10-CM

## 2024-06-12 DIAGNOSIS — I10 ESSENTIAL HYPERTENSION: ICD-10-CM

## 2024-06-12 DIAGNOSIS — F17.200 NICOTINE DEPENDENCE WITH CURRENT USE: ICD-10-CM

## 2024-06-12 DIAGNOSIS — E66.09 CLASS 1 OBESITY DUE TO EXCESS CALORIES WITH SERIOUS COMORBIDITY AND BODY MASS INDEX (BMI) OF 31.0 TO 31.9 IN ADULT: ICD-10-CM

## 2024-06-12 PROCEDURE — 99214 OFFICE O/P EST MOD 30 MIN: CPT | Performed by: STUDENT IN AN ORGANIZED HEALTH CARE EDUCATION/TRAINING PROGRAM

## 2024-06-12 RX ORDER — AMLODIPINE BESYLATE 5 MG/1
5 TABLET ORAL DAILY
Qty: 30 TABLET | Refills: 1 | Status: SHIPPED | OUTPATIENT
Start: 2024-06-12

## 2024-06-12 RX ORDER — AZELASTINE 1 MG/ML
2 SPRAY, METERED NASAL 2 TIMES DAILY
COMMUNITY

## 2024-06-12 RX ORDER — AMLODIPINE BESYLATE 5 MG/1
5 TABLET ORAL DAILY
Qty: 90 TABLET | OUTPATIENT
Start: 2024-06-12

## 2024-06-12 NOTE — PROGRESS NOTES
"  Gurmeet Lopes D.O.  Internal Medicine  [facility]  [address], [address]  [place] [address]  [phone]      Chief Complaint  Hypertension    SUBJECTIVE    History of Present Illness    Piter Valdes is a 44 y.o. male who presents to the office today as an established patient that last saw me on 5/8/2023.     Here today to discuss high blood pressure.  States that he was recently in the emergency department and prescribed amlodipine 5 mg daily for an elevated blood pressure reading, heart racing. He reports his wife checked BP before going to ER and it was \"180/something\".    States he had been drinking lots of alcohol around the time his BP was high. States he took the entire 30 day supply of amlodipine, ended around 3 days ago. He felt normal self on the medication but was not checking BP over this time.  Exercise is primarily home activity.     No Known Allergies     Outpatient Medications Marked as Taking for the 6/12/24 encounter (Office Visit) with Gurmeet Lopes, DO   Medication Sig Dispense Refill    albuterol sulfate  (90 Base) MCG/ACT inhaler 2 puffs every 4-6h as needed for soa 6.7 g 0    azelastine (ASTELIN) 0.1 % nasal spray 2 sprays into the nostril(s) as directed by provider 2 (Two) Times a Day. Use in each nostril as directed      cetirizine (zyrTEC) 10 MG tablet Take 1 tablet by mouth Daily.      fluticasone (FLONASE) 50 MCG/ACT nasal spray 1 spray into the nostril(s) as directed by provider Daily.      predniSONE (DELTASONE) 20 MG tablet 3 po daily x 2d, then 2 po daily x 2d, then 1 po daily x 2d. 12 tablet 0        Past Medical History:   Diagnosis Date    Allergies     Clotting disorder     See comment from GI bleeding    Dog bite     both legs, sutured    GI (gastrointestinal bleed)     Not hilda of dates but I have noticed blood in my stool a few times    Hypertriglyceridemia     Lactose intolerance 10/12/17    Not sure of exact date but has developed " "over time.    ROXANA (obstructive sleep apnea)        OBJECTIVE    Vital Signs:   /80   Pulse 64   Ht 188 cm (74\")   Wt 111 kg (245 lb)   SpO2 97%   BMI 31.46 kg/m²        Physical Exam  Vitals reviewed.   Constitutional:       General: He is not in acute distress.     Appearance: Normal appearance. He is obese. He is not ill-appearing.   Eyes:      General: No scleral icterus.  Cardiovascular:      Rate and Rhythm: Normal rate and regular rhythm.      Heart sounds: Normal heart sounds. No murmur heard.  Pulmonary:      Effort: Pulmonary effort is normal. No respiratory distress.      Breath sounds: Normal breath sounds. No wheezing.   Musculoskeletal:      Right lower leg: No edema.      Left lower leg: No edema.   Skin:     Coloration: Skin is not jaundiced.   Neurological:      Mental Status: He is alert.   Psychiatric:         Mood and Affect: Mood normal.         Behavior: Behavior normal.         Thought Content: Thought content normal.                             ASSESSMENT & PLAN     Diagnoses and all orders for this visit:    1. Essential hypertension (Primary)  2. Nicotine dependence with current use  3. Class 1 obesity due to excess calories with serious comorbidity and body mass index (BMI) of 31.0 to 31.9 in adult  -Seen 5/6/[facility] at [facility] with diagnosis of palpitations, elevated blood pressure reading, bronchospasm.  He had CBC, CMP, troponin which I reviewed and were all unremarkable.  He had chest x-ray which showed no acute cardiopulmonary process.  He was started on prednisone taper for bronchospasm, referred to cardiology and started on amlodipine 5 mg daily.  -/80 in office today, above goal of systolic 130 or less, diastolic 80 or  less.  -at this point it seems he has a diagnosis of HTN. Likely due to a combination of factors: obesity, lack of exercise, smoking.  -we again discussed smoking cessation. He has been prescribed nicotine and chantix in the past but never picked " them up. States he is not quite ready to quit at this time. Recommended he increase physical activity to 30 minutes daily or more, focus on decreased portion sizes and weight loss, reducing alcohol to less than 1 drink daily on average, avoiding high salt foods and choosing low salt options when available.  -we discussed the increasing risk as he ages of heart attack and stroke in the setting of high blood pressure, high cholesterol and cigarette use.   -I will restart his amlodipine 5 mg daily and have him back in 4-5 weeks for a follow up and I have asked him to check BP during that time and bring a log to next visit.   -     amLODIPine (NORVASC) 5 MG tablet; Take 1 tablet by mouth Daily.  Dispense: 30 tablet; Refill: 1      BMI is >= 30 and <35. (Class 1 Obesity). The following options were offered after discussion;: exercise counseling/recommendations and nutrition counseling/recommendations.        Follow Up  Return in about 4 weeks (around 7/10/2024) for Annual physical.    Patient/family had no further questions at this time and verbalized understanding of the plan discussed today.

## 2024-08-27 ENCOUNTER — TELEPHONE (OUTPATIENT)
Dept: INTERNAL MEDICINE | Facility: CLINIC | Age: 44
End: 2024-08-27
Payer: COMMERCIAL

## 2024-08-27 NOTE — TELEPHONE ENCOUNTER
Patient called and states he took his last pill today and needs a refill on:      amLODIPine (NORVASC) 5 MG tablet     Pharmacy:      [id number] [place], [place] - [address] [facility] [address] [facility] - [phone] [facility] [phone]      Patient also wanted me to let Dr. Lopes / Dennies know that his BP is still running a little high even since he has started taking this medication. I told him to take readings and write them down that way when Dr. Lopes or Dennies calls him back they will have an idea of what the next steps are if any. Patient is scheduled for a Physical on 10/02/2024 at 3:15 pm. Please call the patient back at [phone].

## 2024-09-01 DIAGNOSIS — I10 ESSENTIAL HYPERTENSION: ICD-10-CM

## 2024-09-01 RX ORDER — AMLODIPINE BESYLATE 10 MG/1
10 TABLET ORAL DAILY
Qty: 90 TABLET | Refills: 0 | Status: SHIPPED | OUTPATIENT
Start: 2024-09-01

## 2024-09-02 ENCOUNTER — HOSPITAL ENCOUNTER (EMERGENCY)
Facility: HOSPITAL | Age: 44
Discharge: HOME | End: 2024-09-02
Attending: EMERGENCY MEDICINE
Payer: COMMERCIAL

## 2024-09-02 ENCOUNTER — APPOINTMENT (OUTPATIENT)
Dept: GENERAL RADIOLOGY | Facility: HOSPITAL | Age: 44
End: 2024-09-02
Payer: COMMERCIAL

## 2024-09-02 VITALS
HEART RATE: 88 BPM | HEIGHT: 74 IN | RESPIRATION RATE: 17 BRPM | WEIGHT: 250 LBS | SYSTOLIC BLOOD PRESSURE: 152 MMHG | DIASTOLIC BLOOD PRESSURE: 83 MMHG | TEMPERATURE: 98.2 F | OXYGEN SATURATION: 93 % | BODY MASS INDEX: 32.08 KG/M2

## 2024-09-02 DIAGNOSIS — R07.89 ATYPICAL CHEST PAIN: Primary | ICD-10-CM

## 2024-09-02 DIAGNOSIS — R00.2 PALPITATIONS: ICD-10-CM

## 2024-09-02 LAB
ALBUMIN SERPL-MCNC: 4.6 G/DL (ref 3.5–5.2)
ALBUMIN/GLOB SERPL: 1.6 G/DL
ALP SERPL-CCNC: 74 U/L (ref 39–117)
ALT SERPL W P-5'-P-CCNC: 23 U/L (ref 1–41)
ANION GAP SERPL CALCULATED.3IONS-SCNC: 10.8 MMOL/L (ref 5–15)
AST SERPL-CCNC: 27 U/L (ref 1–40)
BASOPHILS # BLD AUTO: 0.06 10*3/MM3 (ref 0–0.2)
BASOPHILS NFR BLD AUTO: 0.6 % (ref 0–1.5)
BILIRUB SERPL-MCNC: <0.2 MG/DL (ref 0–1.2)
BUN SERPL-MCNC: 9 MG/DL (ref 6–20)
BUN/CREAT SERPL: 11.1 (ref 7–25)
CALCIUM SPEC-SCNC: 9 MG/DL (ref 8.6–10.5)
CHLORIDE SERPL-SCNC: 100 MMOL/L (ref 98–107)
CO2 SERPL-SCNC: 25.2 MMOL/L (ref 22–29)
CREAT SERPL-MCNC: 0.81 MG/DL (ref 0.76–1.27)
D DIMER PPP FEU-MCNC: 0.27 MCGFEU/ML (ref 0–0.5)
DEPRECATED RDW RBC AUTO: 39.1 FL (ref 37–54)
EGFRCR SERPLBLD CKD-EPI 2021: 111.5 ML/MIN/1.73
EOSINOPHIL # BLD AUTO: 0.16 10*3/MM3 (ref 0–0.4)
EOSINOPHIL NFR BLD AUTO: 1.6 % (ref 0.3–6.2)
ERYTHROCYTE [DISTWIDTH] IN BLOOD BY AUTOMATED COUNT: 12 % (ref 12.3–15.4)
ETHANOL BLD-MCNC: 86 MG/DL (ref 0–10)
ETHANOL UR QL: 0.09 %
GEN 5 2HR TROPONIN T REFLEX: <6 NG/L
GLOBULIN UR ELPH-MCNC: 2.9 GM/DL
GLUCOSE SERPL-MCNC: 125 MG/DL (ref 65–99)
HCT VFR BLD AUTO: 45.8 % (ref 37.5–51)
HGB BLD-MCNC: 15.7 G/DL (ref 13–17.7)
IMM GRANULOCYTES # BLD AUTO: 0.02 10*3/MM3 (ref 0–0.05)
IMM GRANULOCYTES NFR BLD AUTO: 0.2 % (ref 0–0.5)
LYMPHOCYTES # BLD AUTO: 2.58 10*3/MM3 (ref 0.7–3.1)
LYMPHOCYTES NFR BLD AUTO: 26.4 % (ref 19.6–45.3)
MCH RBC QN AUTO: 29.8 PG (ref 26.6–33)
MCHC RBC AUTO-ENTMCNC: 34.3 G/DL (ref 31.5–35.7)
MCV RBC AUTO: 87.1 FL (ref 79–97)
MONOCYTES # BLD AUTO: 0.71 10*3/MM3 (ref 0.1–0.9)
MONOCYTES NFR BLD AUTO: 7.3 % (ref 5–12)
NEUTROPHILS NFR BLD AUTO: 6.23 10*3/MM3 (ref 1.7–7)
NEUTROPHILS NFR BLD AUTO: 63.9 % (ref 42.7–76)
PLATELET # BLD AUTO: 260 10*3/MM3 (ref 140–450)
PMV BLD AUTO: 9.5 FL (ref 6–12)
POTASSIUM SERPL-SCNC: 4.2 MMOL/L (ref 3.5–5.2)
PROT SERPL-MCNC: 7.5 G/DL (ref 6–8.5)
QT INTERVAL: 365 MS
QTC INTERVAL: 442 MS
RBC # BLD AUTO: 5.26 10*6/MM3 (ref 4.14–5.8)
SODIUM SERPL-SCNC: 136 MMOL/L (ref 136–145)
TROPONIN T DELTA: NORMAL
TROPONIN T SERPL HS-MCNC: <6 NG/L
WBC NRBC COR # BLD AUTO: 9.76 10*3/MM3 (ref 3.4–10.8)

## 2024-09-02 PROCEDURE — 85379 FIBRIN DEGRADATION QUANT: CPT | Performed by: EMERGENCY MEDICINE

## 2024-09-02 PROCEDURE — 25810000003 SODIUM CHLORIDE 0.9 % SOLUTION: Performed by: EMERGENCY MEDICINE

## 2024-09-02 PROCEDURE — 99284 EMERGENCY DEPT VISIT MOD MDM: CPT

## 2024-09-02 PROCEDURE — 80053 COMPREHEN METABOLIC PANEL: CPT | Performed by: EMERGENCY MEDICINE

## 2024-09-02 PROCEDURE — 96360 HYDRATION IV INFUSION INIT: CPT

## 2024-09-02 PROCEDURE — 93005 ELECTROCARDIOGRAM TRACING: CPT | Performed by: EMERGENCY MEDICINE

## 2024-09-02 PROCEDURE — 93010 ELECTROCARDIOGRAM REPORT: CPT | Performed by: INTERNAL MEDICINE

## 2024-09-02 PROCEDURE — 36415 COLL VENOUS BLD VENIPUNCTURE: CPT

## 2024-09-02 PROCEDURE — 99284 EMERGENCY DEPT VISIT MOD MDM: CPT | Performed by: EMERGENCY MEDICINE

## 2024-09-02 PROCEDURE — 82077 ASSAY SPEC XCP UR&BREATH IA: CPT | Performed by: EMERGENCY MEDICINE

## 2024-09-02 PROCEDURE — 84484 ASSAY OF TROPONIN QUANT: CPT | Performed by: EMERGENCY MEDICINE

## 2024-09-02 PROCEDURE — 85025 COMPLETE CBC W/AUTO DIFF WBC: CPT | Performed by: EMERGENCY MEDICINE

## 2024-09-02 PROCEDURE — 71045 X-RAY EXAM CHEST 1 VIEW: CPT

## 2024-09-02 RX ORDER — ASPIRIN 81 MG/1
81 TABLET ORAL DAILY
Qty: 60 TABLET | Refills: 0 | Status: SHIPPED | OUTPATIENT
Start: 2024-09-02 | End: 2024-11-01

## 2024-09-02 RX ORDER — SODIUM CHLORIDE 0.9 % (FLUSH) 0.9 %
10 SYRINGE (ML) INJECTION AS NEEDED
Status: DISCONTINUED | OUTPATIENT
Start: 2024-09-02 | End: 2024-09-02 | Stop reason: HOSPADM

## 2024-09-02 RX ORDER — ASPIRIN 325 MG
325 TABLET ORAL ONCE
Status: COMPLETED | OUTPATIENT
Start: 2024-09-02 | End: 2024-09-02

## 2024-09-02 RX ADMIN — ASPIRIN 325 MG: 325 TABLET ORAL at 18:20

## 2024-09-02 RX ADMIN — SODIUM CHLORIDE 1000 ML: 9 INJECTION, SOLUTION INTRAVENOUS at 18:21

## 2024-09-02 NOTE — Clinical Note
[facility]  [address]  [facility] [id number]    Piter Valdes was seen and treated in our emergency department on 9/2/2024.  He may return to work on 09/04/2024.         Thank you for choosing [facility].    [facility], Todd, DO

## 2024-09-02 NOTE — TELEPHONE ENCOUNTER
Lets increase to amlodipine 10 mg daily. New rx has been sent to his pharmacy. One 10 mg tablet per day.

## 2024-09-02 NOTE — FSED PROVIDER NOTE
Subjective   History of Present Illness  44-year-old male said earlier this afternoon he felt like he was having palpitations and has had something like this before and had a workup which was negative.  He feels like it is beating fast and his blood pressure is higher than normal usually is systolic 120-130 and he is on amlodipine 5 mg once a day.  It is a holiday weekend and he has been with family and to wedding and active.          Review of Systems    Past Medical History:   Diagnosis Date   • Allergies    • Clotting disorder     See comment from GI bleeding   • Dog bite     both legs, sutured   • Essential hypertension    • GI (gastrointestinal bleed)     Not hilda of dates but I have noticed blood in my stool a few times   • Hypertriglyceridemia    • Lactose intolerance 10/12/17    Not sure of exact date but has developed over time.   • ROXANA (obstructive sleep apnea)        No Known Allergies    Past Surgical History:   Procedure Laterality Date   • WISDOM TOOTH EXTRACTION         Family History   Problem Relation Age of Onset   • No Known Problems Mother    • Diabetes Father    • No Known Problems Sister        Social History     Socioeconomic History   • Marital status:      Spouse name: Natalya   Tobacco Use   • Smoking status: Every Day     Current packs/day: 1.00     Average packs/day: 1 pack/day for 30.7 years (30.7 ttl pk-yrs)     Types: Cigarettes     Start date: 1/1/1994   • Smokeless tobacco: Never   • Tobacco comments:     Dont know the date but i started smoking when i was 13 years old.   Vaping Use   • Vaping status: Never Used   Substance and Sexual Activity   • Alcohol use: Yes     Comment: I drink alcohol but i’m not sure of an ammount   • Drug use: Not Currently     Types: Marijuana   • Sexual activity: Yes     Partners: Female     Birth control/protection: None           Objective   Physical Exam  Vitals and nursing note reviewed.   Constitutional:       Appearance: Normal appearance. He  is obese.   HENT:      Mouth/Throat:      Mouth: Mucous membranes are moist.      Pharynx: Oropharynx is clear.   Eyes:      Extraocular Movements: Extraocular movements intact.      Conjunctiva/sclera: Conjunctivae normal.   Cardiovascular:      Rate and Rhythm: Normal rate and regular rhythm.      Heart sounds: Normal heart sounds. No murmur heard.  Pulmonary:      Effort: Pulmonary effort is normal. No respiratory distress.      Breath sounds: Normal breath sounds. No stridor. No wheezing, rhonchi or rales.   Abdominal:      Palpations: Abdomen is soft.   Musculoskeletal:         General: Normal range of motion.      Cervical back: Normal range of motion.      Right lower leg: No edema.      Left lower leg: No edema.   Skin:     General: Skin is warm and dry.      Capillary Refill: Capillary refill takes less than 2 seconds.   Neurological:      General: No focal deficit present.      Mental Status: He is alert and oriented to person, place, and time. Mental status is at baseline.   Psychiatric:         Mood and Affect: Mood normal.         Behavior: Behavior normal.         Thought Content: Thought content normal.         Judgment: Judgment normal.       Procedures           ED Course  ED Course as of 09/02/24 1857   Mon Sep 02, 2024   1838 Ethanol 86 which is still high which may be related to his symptoms today.  CBC and differential remarkable, CMP with his elevated blood sugar 125 otherwise negative, troponin is negative at send 6. [AR]   1838 D-dimer pending 2-hour troponin collection at 6:15 PM [AR]   1839 Chest x-ray no pneumonia no pneumothorax normal mediastinum no pleural effusions [AR]   1840 Patient needs a second troponin and then he can be discharged his EKG is normal his blood work so far only shows alcohol is increased and sugar.  Signed out to overnight physician to follow-up on the troponin and set up discharge papers to use unless something changes during that time. [AR]   1840 Blood pressure  currently 143/74. [AR]   1846 D-dimer is 0.27 so his episode is not related to blood clots. [AR]   1856 S/o to Dr Rodrigues [facility] PMH with similar event with ETOH as part of the event. Troponin #2 pending, set up for discharge [AR]      ED Course User Index  [AR] Tia Stahl MD                HEART Score: 1                            Medical Decision Making  Differential for chest pain/fast heart rate includes, but not limited to: MI, asthma, COPD, PE, pneumothorax, hemopneumothorax, aortic dissection, pericardial tamponade pneumonia, pericarditis, pleuritic chest pain, esophagitis, esophageal varices-rupture, mediastinitis, pleural effusion, pulmonary fluid overload, CHF, acute allergic reaction, carbon monoxide poisoning, pulmonary hypertension, and pulmonary fibrosis.     Heart rate normal here today blood pressure slightly elevated but he just arrived and often first blood pressure in the ER is elevated so we will follow.  All the ASIC blood work will be done including CBC CMP troponin D-dimer considering alcohol since this was a similar episode experience in the past when alcohol was involved.  EKG is normal.  And will get chest x-ray.      Patient with negative second set troponin and patient currently asymptomatic.  Patient states she has had palpitations like this multiple times in the past and will refer patient to cardiology.    Your lab work for your heart today was normal. The chest xray and EKG were normal. Your blood pressure improved. Your alcohol level was elevated. Review of a note with your primary care doctor in the past noted a time that with alcohol use, being a bit over weight and out of shape may have led to similar episodes in the past. I encourage you to stop drinking alcohol altogether, clearly your body does not handle it very well. Work at paying attention to the amount of carbohydrates you are taking in and develop an exercise routine. Please follow up with your PCP within 2  weeks.    Problems Addressed:  Atypical chest pain: complicated acute illness or injury    Amount and/or Complexity of Data Reviewed  External Data Reviewed: labs, radiology and notes.     Details: Note with primary care office in 2023 he was seen and was told and that he been to the ER because of his heart rate and blood pressure was elevated he was placed on amlodipine and he admitted that he been drinking alcohol at that time.  Also was dealing with obesity and lack of exercise.  He has had a workup that showed that he had does not have a significant heart disease.  Labs: ordered. Decision-making details documented in ED Course.  Radiology: ordered and independent interpretation performed. Decision-making details documented in ED Course.  ECG/medicine tests: ordered and independent interpretation performed.     Details: EKG normal sinus rhythm rate 88 normal QRS ST and T waves slightly changed in V1 V2 but similar to May 5, 2024.  STEMI    Risk  OTC drugs.  Prescription drug management.        Final diagnoses:   Atypical chest pain       ED Disposition  ED Disposition       ED Disposition   Discharge    Condition   Stable    Comment   --               Gurmeet Lopes DO  [address] [address]  [phone]               Medication List      No changes were made to your prescriptions during this visit.

## 2024-09-02 NOTE — DISCHARGE INSTRUCTIONS
Your lab work for your heart today was normal. The chest xray and EKG were normal. Your blood pressure improved. Your alcohol level was elevated. Review of a note with your primary care doctor in the past noted a time that with alcohol use, being a bit over weight and out of shape may have led to similar episodes in the past. I encourage you to stop drinking alcohol altogether, clearly your body does not handle it very well. Work at paying attention to the amount of carbohydrates you are taking in and develop an exercise routine. Please follow up with your PCP within 2 weeks.  Follow-up with cardiology referral as discussed.

## 2024-10-02 ENCOUNTER — OFFICE VISIT (OUTPATIENT)
Dept: INTERNAL MEDICINE | Facility: CLINIC | Age: 44
End: 2024-10-02
Payer: COMMERCIAL

## 2024-10-02 VITALS
OXYGEN SATURATION: 97 % | BODY MASS INDEX: 31.7 KG/M2 | HEIGHT: 74 IN | TEMPERATURE: 97.9 F | WEIGHT: 247 LBS | DIASTOLIC BLOOD PRESSURE: 77 MMHG | SYSTOLIC BLOOD PRESSURE: 130 MMHG | HEART RATE: 89 BPM

## 2024-10-02 DIAGNOSIS — Z23 NEED FOR PNEUMOCOCCAL VACCINE: ICD-10-CM

## 2024-10-02 DIAGNOSIS — Z00.00 ANNUAL PHYSICAL EXAM: Primary | ICD-10-CM

## 2024-10-02 DIAGNOSIS — F17.200 NICOTINE DEPENDENCE WITH CURRENT USE: ICD-10-CM

## 2024-10-02 DIAGNOSIS — Z23 NEED FOR TD VACCINE: ICD-10-CM

## 2024-10-02 DIAGNOSIS — I10 ESSENTIAL HYPERTENSION: ICD-10-CM

## 2024-10-02 PROCEDURE — 90472 IMMUNIZATION ADMIN EACH ADD: CPT | Performed by: STUDENT IN AN ORGANIZED HEALTH CARE EDUCATION/TRAINING PROGRAM

## 2024-10-02 PROCEDURE — 90471 IMMUNIZATION ADMIN: CPT | Performed by: STUDENT IN AN ORGANIZED HEALTH CARE EDUCATION/TRAINING PROGRAM

## 2024-10-02 PROCEDURE — 99214 OFFICE O/P EST MOD 30 MIN: CPT | Performed by: STUDENT IN AN ORGANIZED HEALTH CARE EDUCATION/TRAINING PROGRAM

## 2024-10-02 PROCEDURE — 90677 PCV20 VACCINE IM: CPT | Performed by: STUDENT IN AN ORGANIZED HEALTH CARE EDUCATION/TRAINING PROGRAM

## 2024-10-02 PROCEDURE — 99396 PREV VISIT EST AGE 40-64: CPT | Performed by: STUDENT IN AN ORGANIZED HEALTH CARE EDUCATION/TRAINING PROGRAM

## 2024-10-02 PROCEDURE — 90715 TDAP VACCINE 7 YRS/> IM: CPT | Performed by: STUDENT IN AN ORGANIZED HEALTH CARE EDUCATION/TRAINING PROGRAM

## 2024-10-02 RX ORDER — ALBUTEROL SULFATE 90 UG/1
INHALANT RESPIRATORY (INHALATION)
Qty: 18 G | Refills: 3 | Status: SHIPPED | OUTPATIENT
Start: 2024-10-02

## 2024-10-02 RX ORDER — VARENICLINE TARTRATE 0.5 MG/1
TABLET, FILM COATED ORAL
Qty: 11 TABLET | Refills: 0 | Status: SHIPPED | OUTPATIENT
Start: 2024-10-02

## 2024-10-02 RX ORDER — VARENICLINE TARTRATE 1 MG/1
1 TABLET, FILM COATED ORAL 2 TIMES DAILY
Qty: 60 TABLET | Refills: 2 | Status: SHIPPED | OUTPATIENT
Start: 2024-10-02

## 2024-10-02 RX ORDER — PROPRANOLOL HYDROCHLORIDE 80 MG/1
80 CAPSULE, EXTENDED RELEASE ORAL DAILY
Qty: 30 CAPSULE | Refills: 0 | Status: SHIPPED | OUTPATIENT
Start: 2024-10-02

## 2024-10-02 NOTE — PROGRESS NOTES
Gurmeet Lopes D.O.  Internal Medicine  [facility]  [address], [address]  [place] [address]  [phone]    Chief Complaint  Annual checkup    HISTORY    Piter Valdes is a 44 y.o. male who presents to the office today as a  an established patient for their annual preventative exam.      No hospitalization(s) within the last year.     Current exercise regimen: not exercising regularly     Status of chronic medical conditions:    Hyperlipidemia: eats a lot of red meat , not much desserts/pastas . He believes he eats more fried foods than he should.   Lab Results   Component Value Date    CHLPL 194 05/22/2023    TRIG 136 05/22/2023    HDL 41 05/22/2023     (H) 05/22/2023     Exercise induced asthma: uses albuterol inhaler PRN, occurs when in cool air and exercising.    Allergies: takes zyrtec and flonase over the counter    ROXANA: wears CPAP nightly    Smoking: still smoking a pack per day. States his biggest barrier to quitting is himself. Has not tried chantix that was previously prescribed. Has never tried nicotine patches or gum outside of when he flies for cravings. He is interested in having Chantix on hand for when he decides to quit again.    Essential hypertension: takes amlodipine 10 mg daily. 139/87, 142/87, 132/84 are recent numbers for example.     Any other concerns regarding their health today: none    Health Maintenance Summary            Overdue - Pneumococcal Vaccine 0-64 (1 of 2 - PCV) Never done      No completion history exists for this topic.              Overdue - TDAP/TD VACCINES (1 - Tdap) Never done      No completion history exists for this topic.              Overdue - LIPID PANEL (Yearly) Overdue since 5/22/2024 05/22/2023  Lipid Panel With LDL/HDL Ratio    02/06/2023  Lipid Panel With LDL/HDL Ratio              Overdue - INFLUENZA VACCINE (Yearly - August to March) Overdue since 8/1/2024 09/13/2023  Imm Admin: Flublok 18+yrs     09/14/2022  Imm Admin: Fluzone (or Fluarix & Flulaval for VFC) >6mos    09/14/2022  Imm Admin: Influenza Injectable Mdck Pf Quad    09/12/2018  Imm Admin: Flu Vaccine Quad PF 6-35MO    10/26/2016  Outside Immunization: Influenza, P-Free    Only the first 5 history entries have been loaded, but more history exists.              Overdue - COVID-19 Vaccine (1 - 2023-24 season) Never done      No completion history exists for this topic.              BMI FOLLOWUP (Yearly) Next due on 6/12/2025 06/12/2024  [employer]: WORKFLOW - QUALITY MEASUREMENT - DOCUMENTED WEIGHT FOLLOW-UP PLAN              ANNUAL PHYSICAL (Yearly) Next due on 10/2/2025      10/02/2024  Done    02/06/2023  Done              COLORECTAL CANCER SCREENING (COLONOSCOPY - Every 3 Years) Next due on 2/22/2026 02/22/2023  SCANNED - COLONOSCOPY              HEPATITIS C SCREENING  Completed      02/06/2023  Hep C Virus Ab component of Hepatitis C antibody                     No Known Allergies     Outpatient Medications Marked as Taking for the 10/2/24 encounter (Office Visit) with Gurmeet Lopes DO   Medication Sig Dispense Refill    albuterol sulfate  (90 Base) MCG/ACT inhaler 2 puffs every 4-6h as needed for soa 18 g 3    amLODIPine (NORVASC) 10 MG tablet Take 1 tablet by mouth Daily. 90 tablet 0    aspirin 81 MG EC tablet Take 1 tablet by mouth Daily for 60 doses. 60 tablet 0    azelastine (ASTELIN) 0.1 % nasal spray Administer 2 sprays into the nostril(s) as directed by provider 2 (Two) Times a Day. Use in each nostril as directed      cetirizine (zyrTEC) 10 MG tablet Take 1 tablet by mouth Daily.      varenicline (CHANTIX) 1 MG tablet Take 1 tablet by mouth 2 (Two) Times a Day. To start on DAY 8 of treatment. 60 tablet 2    [DISCONTINUED] albuterol sulfate  (90 Base) MCG/ACT inhaler 2 puffs every 4-6h as needed for soa 6.7 g 0       Past Medical History:   Diagnosis Date    Allergies     Dog bite     both legs, sutured    Essential  "hypertension     Hyperlipemia     Lactose intolerance     ROXANA (obstructive sleep apnea)      Past Surgical History:   Procedure Laterality Date    WISDOM TOOTH EXTRACTION       Family History   Problem Relation Age of Onset    Hypertension Mother     Diabetes Father     Hypotension Sister     reports that he has been smoking cigarettes. He started smoking about 30 years ago. He has a 30.8 pack-year smoking history. He has never used smokeless tobacco. He reports that he does not currently use alcohol. He reports that he does not currently use drugs after having used the following drugs: Marijuana.    Immunization History   Administered Date(s) Administered    Flu Vaccine Quad PF 6-35MO 09/12/2018    Flublok 18+yrs 09/13/2023    Fluzone (or Fluarix & Flulaval for VFC) >6mos 09/14/2022    Influenza Injectable Mdck Pf Quad 09/14/2022        OBJECTIVE    Vital Signs:   /77   Pulse 89   Temp 97.9 °F (36.6 °C) (Infrared)   Ht 188 cm (74\")   Wt 112 kg (247 lb)   SpO2 97%   BMI 31.71 kg/m²     Physical Exam  Vitals reviewed.   Constitutional:       General: He is not in acute distress.     Appearance: Normal appearance. He is obese. He is not ill-appearing.   HENT:      Head: Normocephalic and atraumatic.      Right Ear: Tympanic membrane, ear canal and external ear normal. There is no impacted cerumen.      Left Ear: Tympanic membrane, ear canal and external ear normal. There is no impacted cerumen.      Mouth/Throat:      Mouth: Mucous membranes are moist.      Pharynx: No oropharyngeal exudate or posterior oropharyngeal erythema.   Eyes:      General: No scleral icterus.     Extraocular Movements: Extraocular movements intact.      Conjunctiva/sclera: Conjunctivae normal.      Pupils: Pupils are equal, round, and reactive to light.   Cardiovascular:      Rate and Rhythm: Normal rate and regular rhythm.      Heart sounds: Normal heart sounds. No murmur heard.  Pulmonary:      Effort: Pulmonary effort is " normal. No respiratory distress.      Breath sounds: Normal breath sounds. No wheezing.   Abdominal:      General: Bowel sounds are normal. There is no distension.      Palpations: Abdomen is soft.      Tenderness: There is no abdominal tenderness. There is no guarding.   Musculoskeletal:      Cervical back: Neck supple.      Right lower leg: Edema (trace pretibial) present.      Left lower leg: Edema (1+ pretibial) present.   Lymphadenopathy:      Cervical: No cervical adenopathy.   Skin:     General: Skin is warm and dry.      Coloration: Skin is not jaundiced.   Neurological:      General: No focal deficit present.      Mental Status: He is alert and oriented to person, place, and time.      Cranial Nerves: No cranial nerve deficit.      Motor: No weakness.   Psychiatric:         Mood and Affect: Mood normal.         Behavior: Behavior normal.         Thought Content: Thought content normal.                   The 10-year ASCVD risk score (Kamilla CANTRELL, et al., 2019) is: 7.4%    Values used to calculate the score:      Age: 44 years      Sex: Male      Is Non- : No      Diabetic: No      Tobacco smoker: Yes      Systolic Blood Pressure: 130 mmHg      Is BP treated: Yes      HDL Cholesterol: 41 mg/dL      Total Cholesterol: 194 mg/dL           ASSESSMENT & PLAN     Annual Preventative Health Examination  -Age and sex appropriate physical exam performed and documented. Updated past medical, family, social and surgical histories as well as allergies and care team list. Addressed care gaps listed in the medical record.  -Encouraged annual dental and vision exams as part of their overall health.  -Encouraged minimum of 30 minutes or more of exercise at a brisk walk or higher 5 days per week combined with a well-balanced diet.   -Immunizations reviewed and updated in EMR. Tdap, Influenza, Prevnar 20 (Pneumococcal 20-valent conjugate), and COVID19 recommended.  -Lipid screening:  Patient is over age  40 and a 10-year ASCVD risk was calculated which does not indicate a need for statin therapy.   -Aspirin for primary or secondary prevention: Not applicable, patient is less than age 50.  -Depression and Anxiety screening: Patient denies symptom of anxiety or depression.  -Diabetes screening: Patient is 35-70 years of age and overweight, screening for diabetes is indicated every 3 years. Screening is up to date.    Lab Results   Component Value Date    HGBA1C 5.5 02/06/2023     -Tobacco use screening: Conducted and addressed if indicated.   -Alcohol use screening: Conducted and addressed if indicated.   -Illicit drug screening: Conducted and addressed if indicated.   -Abdominal aortic aneurysm screening: AAA screening is not indicated as patient is less than 65 years of age.  -Hypertension screening: Patient with known diagnosis of hypertension and is receiving treatment.  -HIV screening: Patient has already received HIV screening and it was negative. Patient declined repeat screening today.   -Hepatitis C virus screening:  Patient has already completed Hepatitis C screening. Negative screening on file.   -Syphilis screening: Syphilis screening not indicated.  -Hepatitis B virus screening: Screening not indicated, not in a high-risk group.  -Colon cancer screening: receiving early colonoscopies, due again in 2026  -Lung cancer screening: Patient is less than age 50, screening not indicated.  -Prostate cancer screening: Not applicable, patient is less than 50 years old.      Follow up in 1 year for annual physical exam.    Patient/family had no further questions at this time and verbalized understanding of the plan discussed today.     A problem-based visit was also conducted on the same day, see below for assessment and plan    Diagnoses and all orders for this visit:    1. Essential hypertension (Primary)  -takes amlodipine 10 mg daily. 139/87, 142/87, 132/84 are recent numbers for example.   -/77 in office  today, at the upper limit of acceptability for systolic blood pressure.  Discussed that systolic goal is less than 130 on average and diastolic is less than 80 on average.  He does not appear to be meeting this goal.  It is very important to have an aggressive blood pressure target for him at such a young age to reduce future risk of heart and kidney disease.  He has a history of increased heart rate and situational anxiety so a beta-blocker such as propranolol could be a good idea for him.  I will start propranolol LA 80 mg daily and have him back in 1 month for follow-up.  Of note, he does have a cardiology consultation scheduled for later this week which was ordered by the emergency department recently after an episode of tachycardia and high blood pressure.  He was started on aspirin 81 mg daily by the emergency department.  I informed the patient that he should stop aspirin 81 mg daily if he receives a result of no vascular disease from cardiology.  -     propranolol LA (Inderal LA) 80 MG 24 hr capsule; Take 1 capsule by mouth Daily.  Dispense: 30 capsule; Refill: 0    2. Nicotine dependence with current use  - still smoking a pack per day. States his biggest barrier to quitting is himself. Has not tried chantix that was previously prescribed. Has never tried nicotine patches or gum outside of when he flies for cravings. He is interested in having Chantix on hand for when he decides to quit again.  -Stressed importance of smoking cessation in the setting of high cholesterol and hypertension to reduce future risk of heart attack and stroke.  Also to reduce future risk of lung disease.  At this point he is not ready to set a quit date but is interested in a refill of his Chantix.  Chantix was represcribed for the patient today and he was instructed on correct use of the medication.  Readdress at next visit.        The following social determinates of health impact the patient's medical decision making: No social  determinates of health were factored in to today's visit.     Follow Up  Return in about 4 weeks (around 10/30/2024) for Recheck.

## 2024-10-04 ENCOUNTER — OFFICE VISIT (OUTPATIENT)
Dept: CARDIOLOGY | Facility: CLINIC | Age: 44
End: 2024-10-04
Payer: COMMERCIAL

## 2024-10-04 VITALS
HEART RATE: 78 BPM | DIASTOLIC BLOOD PRESSURE: 82 MMHG | SYSTOLIC BLOOD PRESSURE: 128 MMHG | BODY MASS INDEX: 31.44 KG/M2 | OXYGEN SATURATION: 98 % | WEIGHT: 245 LBS | HEIGHT: 74 IN

## 2024-10-04 DIAGNOSIS — I10 ESSENTIAL HYPERTENSION: ICD-10-CM

## 2024-10-04 DIAGNOSIS — R00.2 PALPITATIONS: ICD-10-CM

## 2024-10-04 DIAGNOSIS — Z82.49 FAMILY HISTORY OF EARLY CAD: ICD-10-CM

## 2024-10-04 DIAGNOSIS — E78.00 HYPERCHOLESTEROLEMIA: Primary | ICD-10-CM

## 2024-10-04 PROCEDURE — 99204 OFFICE O/P NEW MOD 45 MIN: CPT | Performed by: INTERNAL MEDICINE

## 2024-10-04 NOTE — PROGRESS NOTES
PATIENTINFORMATION    Date of Office Visit: 10/04/2024  Encounter Provider: Geronimo Barney MD  Place of Service: [facility] CARDIOLOGY  Patient Name: Piter Valdes  : 1980    Subjective:     Encounter Date:10/04/2024      Patient ID: Piter Valdes is a 44 y.o. male.    Chief Complaint   Patient presents with    Palpitations       HPI  Mr. Valdes is a pleasant 44 years old gentleman who came to cardiac clinic for evaluation of palpitations.  He had a total of 2 spells .first one occurred in May 2024 and second one in 2024 for which he had to go to the ER.  He was sitting when he started having sudden palpitations with increased anxiety during which he checked his blood pressure and noted to be elevated.  He does not remember heart rate.  He had lightheadedness but denies fainting, chest pain.  ER EKG where unremarkable.  Overall unremarkable ER workup other than alcohol level of 84.  He was treated differently-first time with antihypertensive in second I am with IV fluids.  He had consumed alcohol on both occasions but no different than other times.  He is fairly active and denies any exertional symptoms like chest pain, significant shortness of breath.  He has been on amlodipine for hypertension and Dr. Lopes added metoprolol that he has not started to take it.  Maternal grandfather with CAD.  No known CAD among parents and siblings.        ROS  All systems reviewed and negative except as noted in HPI.    Past Medical History:   Diagnosis Date    Allergies     Dog bite     both legs, sutured    Essential hypertension     Hyperlipemia     Lactose intolerance     ROXANA (obstructive sleep apnea)        Past Surgical History:   Procedure Laterality Date    WISDOM TOOTH EXTRACTION         Social History     Socioeconomic History    Marital status:      Spouse name: Natalya   Tobacco Use    Smoking status: Every Day     Current packs/day: 1.00     Average  "packs/day: 1 pack/day for 30.8 years (30.8 ttl pk-yrs)     Types: Cigarettes     Start date: 1/1/1994    Smokeless tobacco: Never   Vaping Use    Vaping status: Never Used   Substance and Sexual Activity    Alcohol use: Not Currently    Drug use: Not Currently     Types: Marijuana    Sexual activity: Yes     Partners: Female     Birth control/protection: None       Family History   Problem Relation Age of Onset    Hypertension Mother     Diabetes Father     Hypotension Sister          Procedures       Objective:     /82   Pulse 78   Ht 188 cm (74\")   Wt 111 kg (245 lb)   SpO2 98%   BMI 31.46 kg/m²  Body mass index is 31.46 kg/m².     Constitutional:       General: Not in acute distress.     Appearance: Well-developed. Not diaphoretic.   Eyes:      Pupils: Pupils are equal, round, and reactive to light.   HENT:      Head: Normocephalic and atraumatic.   Neck:      Thyroid: No thyromegaly.   Pulmonary:      Effort: Pulmonary effort is normal. No respiratory distress.      Breath sounds: Normal breath sounds. No wheezing. No rales.   Chest:      Chest wall: Not tender to palpatation.   Cardiovascular:      Normal rate. Regular rhythm.      No gallop.    Pulses:     Intact distal pulses.   Edema:     Peripheral edema absent.   Abdominal:      General: Bowel sounds are normal. There is no distension.      Palpations: Abdomen is soft.      Tenderness: There is no guarding.   Musculoskeletal: Normal range of motion.         General: No deformity.      Cervical back: Normal range of motion and neck supple. Skin:     General: Skin is warm and dry.      Findings: No rash.   Neurological:      Mental Status: Alert and oriented to person, place, and time.      Cranial Nerves: No cranial nerve deficit.      Deep Tendon Reflexes: Reflexes are normal and symmetric.   Psychiatric:         Judgment: Judgment normal.         Review Of Data: I have reviewed pertinent recent labs, images and documents and pertinent findings " included in HPI or assessment below.          Assessment/Plan:   Palpitations-rare.  Last 1 was last month.  Probably from sinus tachycardia.  Essential hypertension  Hypercholesterolemia  Family history of CAD    Normal blood pressure during office visit on amlodipine.  Does not need additional treatment at this point.  Courage to start exercising regularly and cut back on alcohol and pursue healthy lifestyle.  Any further cardiac testing would not be yielding at this point because of rare occurrence of palpitations but he will call back with recurrent palpitations or other concerning symptoms.  He will follow-up with cardiology clinic as needed.    Diagnosis and plan of care discussed with patient and verbalized understanding.            Your medication list            Accurate as of October 4, 2024  2:11 PM. If you have any questions, ask your nurse or doctor.                CONTINUE taking these medications        Instructions Last Dose Given Next Dose Due   albuterol sulfate  (90 Base) MCG/ACT inhaler  Commonly known as: PROVENTIL HFA;VENTOLIN HFA;PROAIR HFA      2 puffs every 4-6h as needed for soa       amLODIPine 10 MG tablet  Commonly known as: NORVASC      Take 1 tablet by mouth Daily.       aspirin 81 MG EC tablet      Take 1 tablet by mouth Daily for 60 doses.       azelastine 0.1 % nasal spray  Commonly known as: ASTELIN      Administer 2 sprays into the nostril(s) as directed by provider 2 (Two) Times a Day. Use in each nostril as directed       cetirizine 10 MG tablet  Commonly known as: zyrTEC      Take 1 tablet by mouth Daily.       propranolol LA 80 MG 24 hr capsule  Commonly known as: Inderal LA      Take 1 capsule by mouth Daily.       varenicline 0.5 MG tablet  Commonly known as: Chantix      For days 1-3 , take one pill daily. For days 4-7, take one pill twice daily       varenicline 1 MG tablet  Commonly known as: CHANTIX      Take 1 tablet by mouth 2 (Two) Times a Day. To start on DAY  8 of treatment.                    Geronimo Barney MD  10/04/24  14:11 EDT

## 2024-12-05 DIAGNOSIS — I10 ESSENTIAL HYPERTENSION: ICD-10-CM

## 2024-12-05 RX ORDER — AMLODIPINE BESYLATE 10 MG/1
10 TABLET ORAL DAILY
Qty: 90 TABLET | Refills: 0 | Status: SHIPPED | OUTPATIENT
Start: 2024-12-05

## 2025-03-12 DIAGNOSIS — I10 ESSENTIAL HYPERTENSION: ICD-10-CM

## 2025-03-12 RX ORDER — AMLODIPINE BESYLATE 10 MG/1
10 TABLET ORAL DAILY
Qty: 90 TABLET | Refills: 2 | Status: SHIPPED | OUTPATIENT
Start: 2025-03-12

## 2025-03-26 RX ORDER — ALBUTEROL SULFATE 90 UG/1
INHALANT RESPIRATORY (INHALATION)
Qty: 54 G | Refills: 3 | Status: SHIPPED | OUTPATIENT
Start: 2025-03-26